# Patient Record
Sex: MALE | Race: ASIAN | NOT HISPANIC OR LATINO | URBAN - METROPOLITAN AREA
[De-identification: names, ages, dates, MRNs, and addresses within clinical notes are randomized per-mention and may not be internally consistent; named-entity substitution may affect disease eponyms.]

---

## 2023-05-10 ENCOUNTER — EMERGENCY (EMERGENCY)
Facility: HOSPITAL | Age: 83
LOS: 0 days | Discharge: HOME | End: 2023-05-10
Attending: EMERGENCY MEDICINE
Payer: MEDICARE

## 2023-05-10 VITALS
DIASTOLIC BLOOD PRESSURE: 65 MMHG | HEART RATE: 94 BPM | TEMPERATURE: 96 F | SYSTOLIC BLOOD PRESSURE: 121 MMHG | OXYGEN SATURATION: 100 % | RESPIRATION RATE: 17 BRPM

## 2023-05-10 DIAGNOSIS — Z00.8 ENCOUNTER FOR OTHER GENERAL EXAMINATION: ICD-10-CM

## 2023-05-10 DIAGNOSIS — Z91.83 WANDERING IN DISEASES CLASSIFIED ELSEWHERE: ICD-10-CM

## 2023-05-10 DIAGNOSIS — F03.918 UNSPECIFIED DEMENTIA, UNSPECIFIED SEVERITY, WITH OTHER BEHAVIORAL DISTURBANCE: ICD-10-CM

## 2023-05-10 PROCEDURE — 99283 EMERGENCY DEPT VISIT LOW MDM: CPT

## 2023-05-10 PROCEDURE — 99285 EMERGENCY DEPT VISIT HI MDM: CPT

## 2023-05-10 NOTE — ED ADULT NURSE NOTE - OBJECTIVE STATEMENT
Pt found to be wandering by the [place] on the streets. Pt is Cantonese speaking of unknown dialect. Pt has a history of dementia and is accompanied by his aide. Pt placed on constant observation in triage.

## 2023-05-10 NOTE — ED ADULT NURSE NOTE - NSFALLHARMRISKINTERV_ED_ALL_ED
Assistance OOB with selected safe patient handling equipment if applicable/Assistance with ambulation/Communicate risk of Fall with Harm to all staff, patient, and family/Monitor gait and stability/Monitor for mental status changes and reorient to person, place, and time, as needed/Move patient closer to nursing station/within visual sight of ED staff/Provide visual cue: red socks, yellow wristband, yellow gown, etc/Reinforce activity limits and safety measures with patient and family/Toileting schedule using arm’s reach rule for commode and bathroom/Use of alarms - bed, stretcher, chair and/or video monitoring/Bed in lowest position, wheels locked, appropriate side rails in place/Call bell, personal items and telephone in reach/Instruct patient to call for assistance before getting out of bed/chair/stretcher/Non-slip footwear applied when patient is off stretcher/[place] to call system/Physically safe environment - no spills, clutter or unnecessary equipment/Purposeful Proactive Rounding/Room/bathroom lighting operational, light cord in reach

## 2023-05-10 NOTE — ED ADULT TRIAGE NOTE - CHIEF COMPLAINT QUOTE
pt found wandering on street by [employer] found to be a missing person with dementia. unknown language. pt family contacted and on way. pt in no signs of distress.

## 2023-05-10 NOTE — ED PROVIDER NOTE - PATIENT PORTAL LINK FT
You can access the FollowMyHealth Patient Portal offered by [facility] by registering at the following website: [email or web address]. By joining [employer]’s FollowMyHealth portal, you will also be able to view your health information using other applications (apps) compatible with our system.

## 2023-05-10 NOTE — ED PROVIDER NOTE - OBJECTIVE STATEMENT
82y M pmh dementia presents for eval. As per aid pt went for a walk today and was unable to find his way home 2/2 dementia. Pt was found by police and brought to ED. Pt baseline as per aid. Denies any complaints.

## 2023-05-10 NOTE — ED PROVIDER NOTE - ATTENDING APP SHARED VISIT CONTRIBUTION OF CARE
81 yo M with PMH of dementia presents to the ED after he was found wandering the streets. His aid states that the  found him and brought him to the hospital. The aid is in the ED now and she states pt is acting his normal self. His son is on the way to pick him up.     Const: NAD  Eyes: PERRL, no conjunctival injection  HENT:  Neck supple without meningismus   CV: RRR, Warm, well-perfused extremities  RESP: CTA B/L, no tachypnea   GI: soft, non-tender, non-distended  MSK: No gross deformities appreciated  Skin: Warm, dry. No rashes, no ecchymosis   Neuro: Alert and walking around with normal gait    will wait for son to pick him up and dc home

## 2023-05-10 NOTE — ED PROVIDER NOTE - PHYSICAL EXAMINATION
CONST: Well appearing in NAD  NECK: Non-tender, no meningeal signs. normal ROM. supple   CARD: S1 S2; No jvd  RESP: Equal BS B/L, No wheezes, rhonchi or rales. No distress  GI: Soft, non-tender, non-distended. no cva tenderness. normal BS  MS: Normal ROM in all extremities. pulses 2 +. no calf tenderness or swelling  SKIN: Warm, dry, no acute rashes. Good turgor  NEURO: No focal deficits. Strength 5/5 with no sensory deficits. Steady gait.

## 2023-06-18 ENCOUNTER — EMERGENCY (EMERGENCY)
Facility: HOSPITAL | Age: 83
LOS: 0 days | Discharge: HOME | End: 2023-06-18
Attending: STUDENT IN AN ORGANIZED HEALTH CARE EDUCATION/TRAINING PROGRAM
Payer: MEDICARE

## 2023-06-18 VITALS
HEART RATE: 100 BPM | RESPIRATION RATE: 20 BRPM | WEIGHT: 125 LBS | SYSTOLIC BLOOD PRESSURE: 132 MMHG | OXYGEN SATURATION: 99 % | DIASTOLIC BLOOD PRESSURE: 64 MMHG | TEMPERATURE: 98 F

## 2023-06-18 DIAGNOSIS — F03.90 UNSPECIFIED DEMENTIA WITHOUT BEHAVIORAL DISTURBANCE: ICD-10-CM

## 2023-06-18 DIAGNOSIS — I10 ESSENTIAL (PRIMARY) HYPERTENSION: ICD-10-CM

## 2023-06-18 PROCEDURE — 99285 EMERGENCY DEPT VISIT HI MDM: CPT

## 2023-06-18 PROCEDURE — 99283 EMERGENCY DEPT VISIT LOW MDM: CPT

## 2023-06-18 PROCEDURE — 82962 GLUCOSE BLOOD TEST: CPT

## 2023-06-18 NOTE — ED PROVIDER NOTE - CLINICAL SUMMARY MEDICAL DECISION MAKING FREE TEXT BOX
82-year-old male with history of severe dementia ANO x0 at baseline, lives with his wife, hypertension presenting today after patient left house and lost his way last night.  Patient is found today by police, patient's daughter-in-law at bedside, denies any change in mental status,.  At this time patient is at baseline.  No recent fevers vomiting or diarrhea.  Rectal temp 99.4.  Otherwise nonfocal neurological exam, disoriented at baseline.  no external signs of trauma. Lungs clear to auscultation bilaterally.  Regular rate rhythm.  Abdomen soft nontender nondistended.  Tolerating oral intake in the ED.  Family states they do not need support taking care of patient. Stable for discharge at this time.

## 2023-06-18 NOTE — ED PROVIDER NOTE - OBJECTIVE STATEMENT
82-year-old male with past medical history of HTN and dementia presents to the ED with daughter after he went missing last night.  [employer] found patient around his home.  Daughter states patient is acting at baseline, denies any concerns. No fever, chills, trauma, weakness.

## 2023-06-18 NOTE — ED PROVIDER NOTE - PHYSICAL EXAMINATION
VITAL SIGNS: I have reviewed nursing notes and confirm.  CONSTITUTIONAL: Elderly male sitting on chair; in no acute distress.  SKIN: Skin exam is warm and dry, no acute rash.  HEAD: Normocephalic; atraumatic.  EYES: PERRL, EOM intact; conjunctiva and sclera clear.  ENT: No nasal discharge; airway clear.   NECK: Supple; non tender.  CARD: S1, S2 normal; no murmurs, gallops, or rubs. Regular rate and rhythm.  RESP: No wheezes, rales or rhonchi. Speaking in full sentences.   ABD: Normal bowel sounds; soft; non-distended; non-tender; No rebound or guarding. No CVA tenderness.  EXT: Normal ROM.   NEURO: Alert, oriented x 0. No focal deficits.

## 2023-06-18 NOTE — ED ADULT TRIAGE NOTE - BP NONINVASIVE DIASTOLIC (MM HG)
64 Libtayo Pregnancy And Lactation Text: This medication is contraindicated in pregnancy and when breast feeding.

## 2023-06-18 NOTE — ED PROVIDER NOTE - PATIENT PORTAL LINK FT
You can access the FollowMyHealth Patient Portal offered by [facility] by registering at the following website: [email or web address]. By joining [employer]’s FollowMyHealth portal, you will also be able to view your health information using other applications (apps) compatible with our system.

## 2023-06-18 NOTE — ED ADULT NURSE NOTE - NSFALLHARMRISKINTERV_ED_ALL_ED

## 2023-06-18 NOTE — ED PROVIDER NOTE - CARE PROVIDER_API CALL
Elias Lucio  Internal Medicine  [phone] [place] [address]  Phone: [phone]  Fax: [phone]  Follow Up Time: 4-6 Days

## 2023-06-18 NOTE — ED ADULT TRIAGE NOTE - CHIEF COMPLAINT QUOTE
[place] and [employer] [place]. was missing over night hx of dementia/ daughter is with him need medical evaluation

## 2024-11-20 NOTE — ED ADULT NURSE NOTE - NS ED NURSE DISCH DISPOSITION
Received request via: Patient    Was the patient seen in the last year in this department? Yes    Does the patient have an active prescription (recently filled or refills available) for medication(s) requested? No    Pharmacy Name:   [facility] [id number] - CECIL GOLDBERG [address] AT [address] [address]  Phone: [phone] Fax: [phone]        Does the patient have [facility] and need 100-day supply? (This applies to ALL medications) Patient does not have SCP   Discharged

## 2024-12-20 ENCOUNTER — INPATIENT (INPATIENT)
Facility: HOSPITAL | Age: 84
LOS: 15 days | Discharge: NURSING, REHAB OR LONG-TERM CARE | DRG: 640 | End: 2025-01-05
Attending: HOSPITALIST | Admitting: STUDENT IN AN ORGANIZED HEALTH CARE EDUCATION/TRAINING PROGRAM
Payer: MEDICARE

## 2024-12-20 VITALS
HEART RATE: 67 BPM | SYSTOLIC BLOOD PRESSURE: 107 MMHG | DIASTOLIC BLOOD PRESSURE: 63 MMHG | WEIGHT: 139.99 LBS | OXYGEN SATURATION: 99 % | RESPIRATION RATE: 18 BRPM | TEMPERATURE: 100 F | HEIGHT: 66 IN

## 2024-12-20 DIAGNOSIS — E87.0 HYPEROSMOLALITY AND HYPERNATREMIA: ICD-10-CM

## 2024-12-20 LAB
ALBUMIN SERPL ELPH-MCNC: 2.7 G/DL — LOW (ref 3.5–5.2)
ALP SERPL-CCNC: 98 U/L — SIGNIFICANT CHANGE UP (ref 30–115)
ALT FLD-CCNC: 57 U/L — HIGH (ref 0–41)
ANION GAP SERPL CALC-SCNC: 13 MMOL/L — SIGNIFICANT CHANGE UP (ref 7–14)
APPEARANCE UR: ABNORMAL
AST SERPL-CCNC: 95 U/L — HIGH (ref 0–41)
BACTERIA # UR AUTO: NEGATIVE /HPF — SIGNIFICANT CHANGE UP
BASOPHILS # BLD AUTO: 0.02 K/UL — SIGNIFICANT CHANGE UP (ref 0–0.2)
BASOPHILS NFR BLD AUTO: 0.2 % — SIGNIFICANT CHANGE UP (ref 0–1)
BILIRUB SERPL-MCNC: 0.2 MG/DL — SIGNIFICANT CHANGE UP (ref 0.2–1.2)
BILIRUB UR-MCNC: NEGATIVE — SIGNIFICANT CHANGE UP
BUN SERPL-MCNC: 91 MG/DL — CRITICAL HIGH (ref 10–20)
CALCIUM SERPL-MCNC: 8.2 MG/DL — LOW (ref 8.4–10.4)
CAST: 21 /LPF — HIGH (ref 0–4)
CHLORIDE SERPL-SCNC: 142 MMOL/L — HIGH (ref 98–110)
CO2 SERPL-SCNC: 20 MMOL/L — SIGNIFICANT CHANGE UP (ref 17–32)
COLOR SPEC: YELLOW — SIGNIFICANT CHANGE UP
CREAT SERPL-MCNC: 2.4 MG/DL — HIGH (ref 0.7–1.5)
DIFF PNL FLD: ABNORMAL
EGFR: 26 ML/MIN/1.73M2 — LOW
EOSINOPHIL # BLD AUTO: 0.12 K/UL — SIGNIFICANT CHANGE UP (ref 0–0.7)
EOSINOPHIL NFR BLD AUTO: 1 % — SIGNIFICANT CHANGE UP (ref 0–8)
GLUCOSE SERPL-MCNC: 138 MG/DL — HIGH (ref 70–99)
GLUCOSE UR QL: NEGATIVE MG/DL — SIGNIFICANT CHANGE UP
HCT VFR BLD CALC: 44.4 % — SIGNIFICANT CHANGE UP (ref 42–52)
HGB BLD-MCNC: 13.1 G/DL — LOW (ref 14–18)
IMM GRANULOCYTES NFR BLD AUTO: 0.6 % — HIGH (ref 0.1–0.3)
KETONES UR-MCNC: NEGATIVE MG/DL — SIGNIFICANT CHANGE UP
LEUKOCYTE ESTERASE UR-ACNC: NEGATIVE — SIGNIFICANT CHANGE UP
LYMPHOCYTES # BLD AUTO: 1.35 K/UL — SIGNIFICANT CHANGE UP (ref 1.2–3.4)
LYMPHOCYTES # BLD AUTO: 10.8 % — LOW (ref 20.5–51.1)
MAGNESIUM SERPL-MCNC: 3.6 MG/DL — CRITICAL HIGH (ref 1.8–2.4)
MAGNESIUM SERPL-MCNC: 4 MG/DL — CRITICAL HIGH (ref 1.8–2.4)
MCHC RBC-ENTMCNC: 29.5 G/DL — LOW (ref 32–37)
MCHC RBC-ENTMCNC: 30.5 PG — SIGNIFICANT CHANGE UP (ref 27–31)
MCV RBC AUTO: 103.3 FL — HIGH (ref 80–94)
MONOCYTES # BLD AUTO: 0.45 K/UL — SIGNIFICANT CHANGE UP (ref 0.1–0.6)
MONOCYTES NFR BLD AUTO: 3.6 % — SIGNIFICANT CHANGE UP (ref 1.7–9.3)
NEUTROPHILS # BLD AUTO: 10.5 K/UL — HIGH (ref 1.4–6.5)
NEUTROPHILS NFR BLD AUTO: 83.8 % — HIGH (ref 42.2–75.2)
NITRITE UR-MCNC: NEGATIVE — SIGNIFICANT CHANGE UP
NRBC # BLD: 0 /100 WBCS — SIGNIFICANT CHANGE UP (ref 0–0)
OSMOLALITY SERPL: 396 MOS/KG — HIGH (ref 280–301)
OSMOLALITY UR: 812 MOS/KG — SIGNIFICANT CHANGE UP (ref 50–1200)
PH UR: 5.5 — SIGNIFICANT CHANGE UP (ref 5–8)
PHOSPHATE SERPL-MCNC: 3.2 MG/DL — SIGNIFICANT CHANGE UP (ref 2.1–4.9)
PLATELET # BLD AUTO: 223 K/UL — SIGNIFICANT CHANGE UP (ref 130–400)
PMV BLD: 11.5 FL — HIGH (ref 7.4–10.4)
POTASSIUM SERPL-MCNC: 4.2 MMOL/L — SIGNIFICANT CHANGE UP (ref 3.5–5)
POTASSIUM SERPL-SCNC: 4.2 MMOL/L — SIGNIFICANT CHANGE UP (ref 3.5–5)
PROT SERPL-MCNC: 6.8 G/DL — SIGNIFICANT CHANGE UP (ref 6–8)
PROT UR-MCNC: 30 MG/DL
RBC # BLD: 4.3 M/UL — LOW (ref 4.7–6.1)
RBC # FLD: 14.4 % — SIGNIFICANT CHANGE UP (ref 11.5–14.5)
RBC CASTS # UR COMP ASSIST: 26 /HPF — HIGH (ref 0–4)
SODIUM SERPL-SCNC: 175 MMOL/L — CRITICAL HIGH (ref 135–146)
SODIUM UR-SCNC: 50 MMOL/L — SIGNIFICANT CHANGE UP
SP GR SPEC: 1.03 — SIGNIFICANT CHANGE UP (ref 1–1.03)
SQUAMOUS # UR AUTO: 4 /HPF — SIGNIFICANT CHANGE UP (ref 0–5)
UROBILINOGEN FLD QL: 1 MG/DL — SIGNIFICANT CHANGE UP (ref 0.2–1)
WBC # BLD: 12.52 K/UL — HIGH (ref 4.8–10.8)
WBC # FLD AUTO: 12.52 K/UL — HIGH (ref 4.8–10.8)
WBC UR QL: 8 /HPF — HIGH (ref 0–5)

## 2024-12-20 PROCEDURE — 71250 CT THORAX DX C-: CPT | Mod: 26,MC

## 2024-12-20 PROCEDURE — 84443 ASSAY THYROID STIM HORMONE: CPT

## 2024-12-20 PROCEDURE — 92526 ORAL FUNCTION THERAPY: CPT | Mod: GN

## 2024-12-20 PROCEDURE — 99285 EMERGENCY DEPT VISIT HI MDM: CPT

## 2024-12-20 PROCEDURE — 86850 RBC ANTIBODY SCREEN: CPT

## 2024-12-20 PROCEDURE — 36415 COLL VENOUS BLD VENIPUNCTURE: CPT

## 2024-12-20 PROCEDURE — 76770 US EXAM ABDO BACK WALL COMP: CPT

## 2024-12-20 PROCEDURE — 99223 1ST HOSP IP/OBS HIGH 75: CPT

## 2024-12-20 PROCEDURE — 71045 X-RAY EXAM CHEST 1 VIEW: CPT | Mod: 26

## 2024-12-20 PROCEDURE — 82607 VITAMIN B-12: CPT

## 2024-12-20 PROCEDURE — 92610 EVALUATE SWALLOWING FUNCTION: CPT | Mod: GN

## 2024-12-20 PROCEDURE — 80053 COMPREHEN METABOLIC PANEL: CPT

## 2024-12-20 PROCEDURE — 84100 ASSAY OF PHOSPHORUS: CPT

## 2024-12-20 PROCEDURE — 85027 COMPLETE CBC AUTOMATED: CPT

## 2024-12-20 PROCEDURE — 87040 BLOOD CULTURE FOR BACTERIA: CPT

## 2024-12-20 PROCEDURE — 76870 US EXAM SCROTUM: CPT

## 2024-12-20 PROCEDURE — 86901 BLOOD TYPING SEROLOGIC RH(D): CPT

## 2024-12-20 PROCEDURE — 93010 ELECTROCARDIOGRAM REPORT: CPT

## 2024-12-20 PROCEDURE — 83735 ASSAY OF MAGNESIUM: CPT

## 2024-12-20 PROCEDURE — 84439 ASSAY OF FREE THYROXINE: CPT

## 2024-12-20 PROCEDURE — 93005 ELECTROCARDIOGRAM TRACING: CPT

## 2024-12-20 PROCEDURE — 82746 ASSAY OF FOLIC ACID SERUM: CPT

## 2024-12-20 PROCEDURE — 86900 BLOOD TYPING SEROLOGIC ABO: CPT

## 2024-12-20 PROCEDURE — 80048 BASIC METABOLIC PNL TOTAL CA: CPT

## 2024-12-20 PROCEDURE — 0241U: CPT

## 2024-12-20 PROCEDURE — 82962 GLUCOSE BLOOD TEST: CPT

## 2024-12-20 PROCEDURE — 85025 COMPLETE CBC W/AUTO DIFF WBC: CPT

## 2024-12-20 RX ORDER — METRONIDAZOLE 250 MG/1
500 TABLET ORAL EVERY 8 HOURS
Refills: 0 | Status: DISCONTINUED | OUTPATIENT
Start: 2024-12-20 | End: 2024-12-21

## 2024-12-20 RX ORDER — HEPARIN SODIUM 1000 [USP'U]/ML
5000 INJECTION, SOLUTION INTRAVENOUS; SUBCUTANEOUS EVERY 12 HOURS
Refills: 0 | Status: DISCONTINUED | OUTPATIENT
Start: 2024-12-20 | End: 2024-12-23

## 2024-12-20 RX ORDER — DONEPEZIL HYDROCHLORIDE 5 MG/1
1 TABLET, FILM COATED ORAL
Refills: 0 | DISCHARGE

## 2024-12-20 RX ORDER — VANCOMYCIN HYDROCHLORIDE 5 G/100ML
750 INJECTION, POWDER, LYOPHILIZED, FOR SOLUTION INTRAVENOUS EVERY 24 HOURS
Refills: 0 | Status: DISCONTINUED | OUTPATIENT
Start: 2024-12-20 | End: 2024-12-21

## 2024-12-20 RX ORDER — ASPIRIN 81 MG
1 TABLET, DELAYED RELEASE (ENTERIC COATED) ORAL
Refills: 0 | DISCHARGE

## 2024-12-20 RX ORDER — CHLORHEXIDINE GLUCONATE 1.2 MG/ML
1 RINSE ORAL DAILY
Refills: 0 | Status: DISCONTINUED | OUTPATIENT
Start: 2024-12-20 | End: 2025-01-05

## 2024-12-20 RX ORDER — QUETIAPINE FUMARATE 100 MG/1
0.5 TABLET, FILM COATED ORAL
Refills: 0 | DISCHARGE

## 2024-12-20 RX ORDER — SODIUM CHLORIDE 9 MG/ML
1000 INJECTION, SOLUTION INTRAVENOUS
Refills: 0 | Status: DISCONTINUED | OUTPATIENT
Start: 2024-12-20 | End: 2024-12-21

## 2024-12-20 RX ORDER — FINASTERIDE 5 MG
1 TABLET ORAL
Refills: 0 | DISCHARGE

## 2024-12-20 RX ORDER — CHOLECALCIFEROL (VITAMIN D3) 10 MCG
1 TABLET ORAL
Refills: 0 | DISCHARGE

## 2024-12-20 RX ORDER — SODIUM CHLORIDE 9 MG/ML
1000 INJECTION, SOLUTION INTRAVENOUS ONCE
Refills: 0 | Status: COMPLETED | OUTPATIENT
Start: 2024-12-20 | End: 2024-12-20

## 2024-12-20 RX ORDER — B COMPLEX, C NO.20/FOLIC ACID 1 MG
1 CAPSULE ORAL
Refills: 0 | DISCHARGE

## 2024-12-20 RX ORDER — AZITHROMYCIN MONOHYDRATE 200 MG/5ML
500 POWDER, FOR SUSPENSION ORAL ONCE
Refills: 0 | Status: COMPLETED | OUTPATIENT
Start: 2024-12-20 | End: 2024-12-20

## 2024-12-20 RX ORDER — CEFTRIAXONE SODIUM 1 G/1
1000 INJECTION, POWDER, FOR SOLUTION INTRAMUSCULAR; INTRAVENOUS ONCE
Refills: 0 | Status: COMPLETED | OUTPATIENT
Start: 2024-12-20 | End: 2024-12-20

## 2024-12-20 RX ADMIN — SODIUM CHLORIDE 1000 MILLILITER(S): 9 INJECTION, SOLUTION INTRAVENOUS at 19:16

## 2024-12-20 RX ADMIN — CEFTRIAXONE SODIUM 100 MILLIGRAM(S): 1 INJECTION, POWDER, FOR SOLUTION INTRAMUSCULAR; INTRAVENOUS at 21:09

## 2024-12-20 RX ADMIN — SODIUM CHLORIDE 75 MILLILITER(S): 9 INJECTION, SOLUTION INTRAVENOUS at 22:59

## 2024-12-20 RX ADMIN — AZITHROMYCIN MONOHYDRATE 255 MILLIGRAM(S): 200 POWDER, FOR SUSPENSION ORAL at 21:28

## 2024-12-20 NOTE — H&P ADULT - HISTORY OF PRESENT ILLNESS
82-year-old male with past medical history of HTN and dementia presents to the ED from SNF for abnormal lab result.    Triage VS: /63, HR 67, T 99.9F, SpO2 99% on RA.  Labs: WBC 12.52k (83.8% neutrophil), CMP with Na 175, Cl 142, BUN/Cr 91/2.4, AST/ALT 95/57, Mag 3.6, serum osm 396.  UA with mod blood (26 RBC), neg LE/nitrite (8 WBC), 21 casts, 4 epithelial cells.  Shant 50, UOsm 812.    CXR (pending official read) appears to have right sided basilar opacity.    ED interventions: 1L LR bolus, 1g ceftriaxone, 500mg azithromycin,  84-year-old male with past medical history of HTN, BPH, CAD, vitamin D def, and Alzheimer's dementia presents to the ED from SNF for abnormal lab result of Na 177. The patient is awake and active but non-verbal.  Called son on both the numbers provided on NH paperwork, he answered but said no English --> called on both numbers again with Cantonese  but he did not answer.  Also unsure if language is Cantonese or Mandarin but the patient did not respond to questions using either .  Cantonese: [id number], [id number].  Mandarin: [id number].    NKA per NH paperwork.    Triage VS: /63, HR 67, T 99.9F, SpO2 99% on RA.  Labs: WBC 12.52k (83.8% neutrophil), CMP with Na 175, Cl 142, BUN/Cr 91/2.4, AST/ALT 95/57, Mag 3.6, serum osm 396.  UA with mod blood (26 RBC), neg LE/nitrite (8 WBC), 21 casts, 4 epithelial cells.  Shant 50, UOsm 812.    CXR (pending official read) appears to have right sided basilar opacity.    ED interventions: 1L LR bolus, 1g ceftriaxone, 500mg azithromycin,

## 2024-12-20 NOTE — H&P ADULT - NSICDXPASTMEDICALHX_GEN_ALL_CORE_FT
PAST MEDICAL HISTORY:  Alzheimer disease     Benign essential HTN     BPH (benign prostatic hyperplasia)     CAD (coronary artery disease)     Central pain syndrome     Vitamin D deficiency

## 2024-12-20 NOTE — ED PROVIDER NOTE - CLINICAL SUMMARY MEDICAL DECISION MAKING FREE TEXT BOX
Patient ANO x 0.  Presented nursing home with no proper history provided.  Appears very dehydrated cachectic and chronically ill.  Chest x-ray interpreted by me noted an opacity of the left lung possibly malignancy versus pneumonia.  Patient severely hypernatremic given small amount of fluids.  Admitted to the stepdown unit for further electrolyte monitoring and treament

## 2024-12-20 NOTE — ED PROVIDER NOTE - ATTENDING APP SHARED VISIT CONTRIBUTION OF CARE
I saw and evaluated the patient on my own.  Briefly, I have the following impression and plan...  hypernatremiaSent in from nursing home.  Severely cachectic ill-appearing.Infectious workup and malignancy workup required  Please see MDM for further details.

## 2024-12-20 NOTE — H&P ADULT - ATTENDING COMMENTS
Patient seen and examined at bedside independently of the residents. I read the resident's note and agree with the plan with the additions and corrections as noted below. My note supersedes the resident's note.     REVIEW OF SYSTEMS:  Negative except in HPI.     PMH:  HTN, CAD, BPH and Alzheimer dementia    FHx: Reviewed. No fhx of asthma/copd, No fhx of liver and pulmonary disease. No fhx of hematological disorder.     Physical Exam:  GEN: No acute distress. Awake, Alert. Non verbal. Not following commands.   Head: Atraumatic, Normocephalic.   Eye: PEERLA. No sclera icterus.    ENT: Normal oropharynx, no thyromegaly, no mass, no lymphadenopathy.   LUNGS: Clear to auscultation bilaterally. No wheeze/rales/crackles.   HEART: Normal. S1/S2 present. RRR. No murmur/gallops.   ABD: Soft, non-tender, non-distended. Bowel sounds present.   EXT: No pitting edema. No erythema. No tenderness.  Integumentary: No rash, No sore, No petechia.   NEURO: Moving all extremities.     Vital Signs Last 24 Hrs  T(C): 37.2 (20 Dec 2024 23:25), Max: 37.7 (20 Dec 2024 17:16)  T(F): 98.9 (20 Dec 2024 23:25), Max: 99.9 (20 Dec 2024 17:16)  HR: 66 (21 Dec 2024 00:05) (66 - 77)  BP: 90/51 (21 Dec 2024 00:05) (90/51 - 108/50)  BP(mean): --  RR: 18 (21 Dec 2024 00:05) (18 - 18)  SpO2: 100% (21 Dec 2024 00:05) (99% - 100%)    Parameters below as of 21 Dec 2024 00:05  Patient On (Oxygen Delivery Method): room air      Please see the above notes for Labs and radiology.     Assessment and Plan:     85 yo M with hx of HTN, CAD, BPH and Alzheimer dementia  presents from NH for hypernatremia.     Hypernatremia 2/2 dehydration  - s/p 1L LR bolus given in ED.   - c/w LR @ 75cc/hr x 12 hrs  -  monitor BMP closely.   - Do not correct serum Na > 8mEq per 24hr.     RLL PNA 2/2 aspiration?  - CT shows Right lower lobe multifocal consolidative and nodular opacities. Additional smaller nodular opacities in all 5 lobes. Findings most likely infectious in nature. Correlate with symptoms. Given the indicated concern for malignancy, follow up in one month is recommended for resolution and re-evaluation.  - Vanc, Cefepime and flagyl for now  - check BCx, MRSA nares  - aspiration precaution.   - NPO with speech and swallow eval.     HTN - hold anti-HTN medication for now. BP borderline.   Alzheimer dementia - c/w home med.   BPH - c/w home med.    DVT ppx: Lovenox SC  GI ppx: not indicated.   Diet: NPO with speech and swallow eval  Activity: as tolerated.     Date seen by the attendin2024  I have spent 75 minutes of time on the encounter which excludes teaching and/or separately reported services.

## 2024-12-20 NOTE — H&P ADULT - ASSESSMENT
84-year-old male with past medical history of HTN, BPH, CAD, vitamin D def, and Alzheimer's dementia presents to the ED from SNF for abnormal lab result of Na 177. The patient is awake and active but non-verbal.    #Severe hypernatremia likely 2/2 dehydration/poor PO intake  - CMP with Na 175, Cl 142, BUN/Cr 91/2.4, serum osm 396.  - UA with mod blood (26 RBC), neg LE/nitrite (8 WBC), 21 casts, 4 epithelial cells.  - Shant 50, UOsm 812.  - Appears dry on exam.  Plan:  - Already received 1L LR bolus from ED.  - Will start LR at 75cc/hr and repeat profile at 11:30 pm to monitor for correction rate.  - F/u nephro consult (Dr. Mondragon).    #Possible aspiration pneumonia  - Triage VS: /63, HR 67, T 99.9F, SpO2 99% on RA.  - Labs: WBC 12.52k (83.8% neutrophil)  - ED interventions: 1L LR bolus, 1g ceftriaxone, 500mg azithromycin.  - CT chest non con: Right lower lobe multifocal consolidative and nodular opacities. Additional smaller nodular opacities in all 5 lobes. Findings most likely infectious in nature. Correlate with symptoms. Given the indicated concern for malignancy, follow up in one month is recommended for resolution and re-evaluation.  Plan:  - Renally dosed antibiotics as follows: cefepime 2g Q24h, vancomycin 750mg Q24h, metronidazole 500 Q8H.  - Strict NPO until s/s evaluation.  - Aspiration precautions.  - F/u flu rsv covid swab (sent).  - F/u BCx, urine strep and legionella. Doubt sending sputum culture will be possible given that the patient does not follow commands.  - Aspiration precautions.    #Dementia  - Hold PO meds, reintroduce based on s/s eval.    #HTN  - BP soft, hold home antihypertensive.    #CAD  - Hold ASA (given NPO).    #BPH  - Hold home meds (given NPO).    #DIET: NPO until s/s eval  #DVTppx: SQH Q12h  #GIppx: NI  #Activity: Increase as tolerated  #CODE: FULL  #Disposition: from [facility], will likely return when stable.

## 2024-12-20 NOTE — H&P ADULT - NSHPPHYSICALEXAM_GEN_ALL_CORE
GENERAL: NAD, lying in bed comfortably  HEAD:  Atraumatic, normocephalic  EYES: EOMI, PERRL  HEART: Regular rate and rhythm  LUNGS: Unlabored respirations. Clear to auscultation bilaterally, no wheezing  ABDOMEN: Soft, nontender, nondistended  EXTREMITIES: No edema, skin appears dry  NERVOUS SYSTEM: Awake and alert, fixing blanket each time examination is attempted, but non-verbal, not following commands using English, Cantonese, or Mandarin translators. Moving every limb on his own accord. Not agitated.

## 2024-12-20 NOTE — ED PROVIDER NOTE - PHYSICAL EXAMINATION
VITAL SIGNS: I have reviewed nursing notes and confirm.  CONSTITUTIONAL: +Cachetic elderly male in no acute distress.  SKIN: Skin exam is warm and dry, no acute rash.  HEAD: Normocephalic; atraumatic.  EYES: PERRL, EOM intact; conjunctiva and sclera clear.  ENT: No nasal discharge; airway clear.   NECK: Supple; non tender.  CARD: S1, S2 normal; no murmurs, gallops, or rubs. Regular rate and rhythm.  RESP: Clear to auscultation bilaterally. No wheezes, rales or rhonchi.  ABD: Normal bowel sounds; soft; non-distended; non-tender.   EXT: Normal ROM. No edema.  LYMPH: No acute cervical adenopathy.  NEURO: AAOx0.   PSYCH: Cooperative, appropriate.

## 2024-12-21 LAB
ALBUMIN SERPL ELPH-MCNC: 2.5 G/DL — LOW (ref 3.5–5.2)
ALP SERPL-CCNC: 100 U/L — SIGNIFICANT CHANGE UP (ref 30–115)
ALT FLD-CCNC: 46 U/L — HIGH (ref 0–41)
ANION GAP SERPL CALC-SCNC: 10 MMOL/L — SIGNIFICANT CHANGE UP (ref 7–14)
ANION GAP SERPL CALC-SCNC: 9 MMOL/L — SIGNIFICANT CHANGE UP (ref 7–14)
AST SERPL-CCNC: 60 U/L — HIGH (ref 0–41)
BASOPHILS # BLD AUTO: 0.03 K/UL — SIGNIFICANT CHANGE UP (ref 0–0.2)
BASOPHILS NFR BLD AUTO: 0.3 % — SIGNIFICANT CHANGE UP (ref 0–1)
BILIRUB SERPL-MCNC: 0.3 MG/DL — SIGNIFICANT CHANGE UP (ref 0.2–1.2)
BUN SERPL-MCNC: 76 MG/DL — CRITICAL HIGH (ref 10–20)
BUN SERPL-MCNC: 82 MG/DL — CRITICAL HIGH (ref 10–20)
CALCIUM SERPL-MCNC: 7.9 MG/DL — LOW (ref 8.4–10.5)
CALCIUM SERPL-MCNC: 8.4 MG/DL — SIGNIFICANT CHANGE UP (ref 8.4–10.5)
CHLORIDE SERPL-SCNC: 141 MMOL/L — HIGH (ref 98–110)
CHLORIDE SERPL-SCNC: 143 MMOL/L — HIGH (ref 98–110)
CO2 SERPL-SCNC: 23 MMOL/L — SIGNIFICANT CHANGE UP (ref 17–32)
CO2 SERPL-SCNC: 23 MMOL/L — SIGNIFICANT CHANGE UP (ref 17–32)
CREAT SERPL-MCNC: 2.1 MG/DL — HIGH (ref 0.7–1.5)
CREAT SERPL-MCNC: 2.2 MG/DL — HIGH (ref 0.7–1.5)
EGFR: 29 ML/MIN/1.73M2 — LOW
EGFR: 30 ML/MIN/1.73M2 — LOW
EOSINOPHIL # BLD AUTO: 0.23 K/UL — SIGNIFICANT CHANGE UP (ref 0–0.7)
EOSINOPHIL NFR BLD AUTO: 1.9 % — SIGNIFICANT CHANGE UP (ref 0–8)
FLUAV AG NPH QL: SIGNIFICANT CHANGE UP
FLUBV AG NPH QL: SIGNIFICANT CHANGE UP
GLUCOSE SERPL-MCNC: 123 MG/DL — HIGH (ref 70–99)
GLUCOSE SERPL-MCNC: 81 MG/DL — SIGNIFICANT CHANGE UP (ref 70–99)
HCT VFR BLD CALC: 43.9 % — SIGNIFICANT CHANGE UP (ref 42–52)
HGB BLD-MCNC: 12.7 G/DL — LOW (ref 14–18)
IMM GRANULOCYTES NFR BLD AUTO: 0.7 % — HIGH (ref 0.1–0.3)
LYMPHOCYTES # BLD AUTO: 1.55 K/UL — SIGNIFICANT CHANGE UP (ref 1.2–3.4)
LYMPHOCYTES # BLD AUTO: 13.1 % — LOW (ref 20.5–51.1)
MAGNESIUM SERPL-MCNC: 3.5 MG/DL — CRITICAL HIGH (ref 1.8–2.4)
MCHC RBC-ENTMCNC: 28.9 G/DL — LOW (ref 32–37)
MCHC RBC-ENTMCNC: 30.4 PG — SIGNIFICANT CHANGE UP (ref 27–31)
MCV RBC AUTO: 105 FL — HIGH (ref 80–94)
MONOCYTES # BLD AUTO: 0.49 K/UL — SIGNIFICANT CHANGE UP (ref 0.1–0.6)
MONOCYTES NFR BLD AUTO: 4.1 % — SIGNIFICANT CHANGE UP (ref 1.7–9.3)
NEUTROPHILS # BLD AUTO: 9.44 K/UL — HIGH (ref 1.4–6.5)
NEUTROPHILS NFR BLD AUTO: 79.9 % — HIGH (ref 42.2–75.2)
NRBC # BLD: 0 /100 WBCS — SIGNIFICANT CHANGE UP (ref 0–0)
PHOSPHATE SERPL-MCNC: 2.7 MG/DL — SIGNIFICANT CHANGE UP (ref 2.1–4.9)
PLATELET # BLD AUTO: 203 K/UL — SIGNIFICANT CHANGE UP (ref 130–400)
PMV BLD: 11.8 FL — HIGH (ref 7.4–10.4)
POTASSIUM SERPL-MCNC: 4 MMOL/L — SIGNIFICANT CHANGE UP (ref 3.5–5)
POTASSIUM SERPL-MCNC: 4.3 MMOL/L — SIGNIFICANT CHANGE UP (ref 3.5–5)
POTASSIUM SERPL-SCNC: 4 MMOL/L — SIGNIFICANT CHANGE UP (ref 3.5–5)
POTASSIUM SERPL-SCNC: 4.3 MMOL/L — SIGNIFICANT CHANGE UP (ref 3.5–5)
PROT SERPL-MCNC: 6.5 G/DL — SIGNIFICANT CHANGE UP (ref 6–8)
RBC # BLD: 4.18 M/UL — LOW (ref 4.7–6.1)
RBC # FLD: 14.3 % — SIGNIFICANT CHANGE UP (ref 11.5–14.5)
RSV RNA NPH QL NAA+NON-PROBE: SIGNIFICANT CHANGE UP
SARS-COV-2 RNA SPEC QL NAA+PROBE: SIGNIFICANT CHANGE UP
SODIUM SERPL-SCNC: 173 MMOL/L — CRITICAL HIGH (ref 135–146)
SODIUM SERPL-SCNC: 176 MMOL/L — CRITICAL HIGH (ref 135–146)
WBC # BLD: 11.82 K/UL — HIGH (ref 4.8–10.8)
WBC # FLD AUTO: 11.82 K/UL — HIGH (ref 4.8–10.8)

## 2024-12-21 PROCEDURE — 99291 CRITICAL CARE FIRST HOUR: CPT

## 2024-12-21 PROCEDURE — 99233 SBSQ HOSP IP/OBS HIGH 50: CPT

## 2024-12-21 PROCEDURE — 93010 ELECTROCARDIOGRAM REPORT: CPT

## 2024-12-21 PROCEDURE — 76770 US EXAM ABDO BACK WALL COMP: CPT | Mod: 26

## 2024-12-21 RX ORDER — SODIUM CHLORIDE 9 MG/ML
1000 INJECTION, SOLUTION INTRAVENOUS
Refills: 0 | Status: DISCONTINUED | OUTPATIENT
Start: 2024-12-21 | End: 2024-12-22

## 2024-12-21 RX ORDER — PIPERACILLIN AND TAZOBACTAM 3; .375 G/15ML; G/15ML
2.25 INJECTION, POWDER, LYOPHILIZED, FOR SOLUTION INTRAVENOUS ONCE
Refills: 0 | Status: COMPLETED | OUTPATIENT
Start: 2024-12-21 | End: 2024-12-21

## 2024-12-21 RX ORDER — PIPERACILLIN AND TAZOBACTAM 3; .375 G/15ML; G/15ML
2.25 INJECTION, POWDER, LYOPHILIZED, FOR SOLUTION INTRAVENOUS EVERY 8 HOURS
Refills: 0 | Status: DISCONTINUED | OUTPATIENT
Start: 2024-12-21 | End: 2024-12-24

## 2024-12-21 RX ADMIN — PIPERACILLIN AND TAZOBACTAM 200 GRAM(S): 3; .375 INJECTION, POWDER, LYOPHILIZED, FOR SOLUTION INTRAVENOUS at 09:33

## 2024-12-21 RX ADMIN — HEPARIN SODIUM 5000 UNIT(S): 1000 INJECTION, SOLUTION INTRAVENOUS; SUBCUTANEOUS at 18:16

## 2024-12-21 RX ADMIN — SODIUM CHLORIDE 80 MILLILITER(S): 9 INJECTION, SOLUTION INTRAVENOUS at 09:34

## 2024-12-21 RX ADMIN — HEPARIN SODIUM 5000 UNIT(S): 1000 INJECTION, SOLUTION INTRAVENOUS; SUBCUTANEOUS at 05:39

## 2024-12-21 RX ADMIN — PIPERACILLIN AND TAZOBACTAM 2.25 GRAM(S): 3; .375 INJECTION, POWDER, LYOPHILIZED, FOR SOLUTION INTRAVENOUS at 13:25

## 2024-12-21 RX ADMIN — PIPERACILLIN AND TAZOBACTAM 200 GRAM(S): 3; .375 INJECTION, POWDER, LYOPHILIZED, FOR SOLUTION INTRAVENOUS at 13:03

## 2024-12-21 RX ADMIN — CHLORHEXIDINE GLUCONATE 1 APPLICATION(S): 1.2 RINSE ORAL at 14:58

## 2024-12-21 RX ADMIN — PIPERACILLIN AND TAZOBACTAM 200 GRAM(S): 3; .375 INJECTION, POWDER, LYOPHILIZED, FOR SOLUTION INTRAVENOUS at 21:32

## 2024-12-21 RX ADMIN — METRONIDAZOLE 100 MILLIGRAM(S): 250 TABLET ORAL at 05:39

## 2024-12-21 NOTE — CONSULT NOTE ADULT - SUBJECTIVE AND OBJECTIVE BOX
Patient is a 84y old  Male who presents with a chief complaint of abnormal labs    84-year-old male with past medical history of HTN, BPH, CAD, vitamin D def, and Alzheimer's dementia presents to the ED from SNF for abnormal lab result of Na 177. The patient is awake and active but non-verbal.  Called son on both the numbers provided on NH paperwork, he answered but said no English --> called on both numbers again with Cantonese  but he did not answer.  Also unsure if language is Cantonese or Mandarin but the patient did not respond to questions using either .  Cantonese: [id number], [id number].  Mandarin: [id number].    NKA per NH paperwork.    Triage VS: /63, HR 67, T 99.9F, SpO2 99% on RA.  Labs: WBC 12.52k (83.8% neutrophil), CMP with Na 175, Cl 142, BUN/Cr 91/2.4, AST/ALT 95/57, Mag 3.6, serum osm 396.  UA with mod blood (26 RBC), neg LE/nitrite (8 WBC), 21 casts, 4 epithelial cells.  Shant 50, UOsm 812.    ED interventions: 1L LR bolus, 1g ceftriaxone, 500mg azithromycin,  (20 Dec 2024 21:20)  admitted to SDU, called to evaluate    PAST MEDICAL & SURGICAL HISTORY:  CAD (coronary artery disease)      Central pain syndrome      BPH (benign prostatic hyperplasia)      Benign essential HTN      Vitamin D deficiency      Alzheimer disease          SOCIAL HX:   Smoking  uto        REVIEW OF SYSTEMS SEE HPI    Allergies    No Known Allergies    Intolerances        cefepime   IVPB 2000 milliGRAM(s) IV Intermittent every 24 hours  chlorhexidine 2% Cloths 1 Application(s) Topical daily  heparin   Injectable 5000 Unit(s) SubCutaneous every 12 hours  lactated ringers. 1000 milliLiter(s) IV Continuous <Continuous>  metroNIDAZOLE  IVPB 500 milliGRAM(s) IV Intermittent every 8 hours  vancomycin  IVPB 750 milliGRAM(s) IV Intermittent every 24 hours  : Home Meds:      PHYSICAL EXAM    ICU Vital Signs Last 24 Hrs  T(C): 37.2 (20 Dec 2024 23:25), Max: 37.7 (20 Dec 2024 17:16)  T(F): 98.9 (20 Dec 2024 23:25), Max: 99.9 (20 Dec 2024 17:16)  HR: 68 (21 Dec 2024 05:31) (66 - 77)  BP: 126/66 (21 Dec 2024 05:31) (90/51 - 126/66)  RR: 18 (21 Dec 2024 05:31) (18 - 18)  SpO2: 100% (21 Dec 2024 05:31) (99% - 100%)    O2 Parameters below as of 21 Dec 2024 05:31  Patient On (Oxygen Delivery Method): room air            General: Ill looking  Lungs: Bilateral rhonchi  Cardiovascular: DANE 2.6  Abdomen: Soft, Positive BS  Extremities: No clubbing  Neurological: Non focal         LABS:                          13.1   12.52 )-----------( 223      ( 20 Dec 2024 18:45 )             44.4                                               12-21    173[HH]  |  141[H]  |  82[HH]  ----------------------------<  123[H]  4.0   |  23  |  2.2[H]    Ca    7.9[L]      21 Dec 2024 00:59  Phos  3.2     12-20  Mg     4.0     12-20    TPro  6.8  /  Alb  2.7[L]  /  TBili  0.2  /  DBili  x   /  AST  95[H]  /  ALT  57[H]  /  AlkPhos  98  12-20                                             Urinalysis Basic - ( 21 Dec 2024 00:59 )    Color: x / Appearance: x / SG: x / pH: x  Gluc: 123 mg/dL / Ketone: x  / Bili: x / Urobili: x   Blood: x / Protein: x / Nitrite: x   Leuk Esterase: x / RBC: x / WBC x   Sq Epi: x / Non Sq Epi: x / Bacteria: x                                                  LIVER FUNCTIONS - ( 20 Dec 2024 18:45 )  Alb: 2.7 g/dL / Pro: 6.8 g/dL / ALK PHOS: 98 U/L / ALT: 57 U/L / AST: 95 U/L / GGT: x                                                  Urinalysis with Rflx Culture (collected 20 Dec 2024 19:08)                                                                                               MEDICATIONS  (STANDING):  cefepime   IVPB 2000 milliGRAM(s) IV Intermittent every 24 hours  chlorhexidine 2% Cloths 1 Application(s) Topical daily  heparin   Injectable 5000 Unit(s) SubCutaneous every 12 hours  lactated ringers. 1000 milliLiter(s) (75 mL/Hr) IV Continuous <Continuous>  metroNIDAZOLE  IVPB 500 milliGRAM(s) IV Intermittent every 8 hours  vancomycin  IVPB 750 milliGRAM(s) IV Intermittent every 24 hours    CT/ CXR reviewed

## 2024-12-21 NOTE — PROGRESS NOTE ADULT - ASSESSMENT
84-year-old male with past medical history of HTN, BPH, CAD, vitamin D def, and Alzheimer's dementia presents to the ED from SNF for abnormal lab result of Na 177. The patient is awake and active but non-verbal.    #Severe hypernatremia likely 2/2 dehydration/poor PO intake  - CMP with Na 175, Cl 142, BUN/Cr 91/2.4, serum osm 396.  - UA with mod blood (26 RBC), neg LE/nitrite (8 WBC), 21 casts, 4 epithelial cells.  - Shant 50, UOsm 812.  - Appears dry on exam.  - F/u nephro consult (Dr. Mondragon).  - changed fluid to D5 1/2 NS  - f/u BMP at 4PM  - correct Mag    #Possible aspiration pneumonia  - Triage VS: /63, HR 67, T 99.9F, SpO2 99% on RA.  - Labs: WBC 12.52k (83.8% neutrophil)  - ED interventions: 1L LR bolus, 1g ceftriaxone, 500mg azithromycin.  - CT chest non con: Right lower lobe multifocal consolidative and nodular opacities. Additional smaller nodular opacities in all 5 lobes. Findings most likely infectious in nature. Correlate with symptoms. Given the indicated concern for malignancy, follow up in one month is recommended for resolution and re-evaluation.  - Strict NPO until s/s evaluation.  - Aspiration precautions.  - F/u flu rsv covid swab (sent).  - F/u BCx, urine strep and legionella. Doubt sending sputum culture will be possible given that the patient does not follow commands.  - Aspiration precautions.  - switch abx to zosyn (renal dosing)    #Dementia  - Hold PO meds, reintroduce based on s/s eval.    #HTN  - BP soft, hold home antihypertensive.    #CAD  - Hold ASA (given NPO).    #BPH  - Hold home meds (given NPO).    #MISC  #DIET: NPO until s/s eval  #DVTppx: SQH Q12h  #GIppx: NI  #Activity: Increase as tolerated  #CODE: FULL  #Disposition: from [facility], will likely return when stable.    #PENDING  - changed fluid to D5 1/2 NS  - f/u BMP at 4PM  - correct Mag  - switch abx to zosyn (renal dosing)   84-year-old male with past medical history of HTN, BPH, CAD, vitamin D def, and Alzheimer's dementia presents to the ED from SNF for abnormal lab result of Na 177. The patient is awake and active but non-verbal.    #Severe hypernatremia likely 2/2 dehydration/poor PO intake  - CMP with Na 175, Cl 142, BUN/Cr 91/2.4, serum osm 396.  - UA with mod blood (26 RBC), neg LE/nitrite (8 WBC), 21 casts, 4 epithelial cells.  - Shant 50, UOsm 812.  - Appears dry on exam.  - F/u nephro consult (Dr. Mondragon).  - changed fluid to D5 1/2 NS  - f/u BMP at 4PM  - correct Mag    #Possible aspiration pneumonia  - Triage VS: /63, HR 67, T 99.9F, SpO2 99% on RA.  - Labs: WBC 12.52k (83.8% neutrophil)  - ED interventions: 1L LR bolus, 1g ceftriaxone, 500mg azithromycin.  - CT chest non con: Right lower lobe multifocal consolidative and nodular opacities. Additional smaller nodular opacities in all 5 lobes. Findings most likely infectious in nature. Correlate with symptoms. Given the indicated concern for malignancy, follow up in one month is recommended for resolution and re-evaluation.  - Strict NPO until s/s evaluation.  - Aspiration precautions.  - F/u flu rsv covid swab (sent).  - F/u BCx, urine strep and legionella. Doubt sending sputum culture will be possible given that the patient does not follow commands.  - Aspiration precautions.  - repeat CT after PNA treatment  - op follow up for possible mets  - switch abx to zosyn (renal dosing)    #Dementia  - Hold PO meds, reintroduce based on s/s eval.    #HTN  - BP soft, hold home antihypertensive.    #CAD  - Hold ASA (given NPO).    #BPH  - Hold home meds (given NPO).    #MISC  #DIET: NPO until s/s eval  #DVTppx: SQH Q12h  #GIppx: NI  #Activity: Increase as tolerated  #CODE: FULL  #[facility] will [facility] return when stable.    #PENDING  - changed fluid to D5 1/2 NS  - f/u BMP at 4PM  - correct Mag  - switch abx to zosyn (renal dosing)   84-year-old male with past medical history of HTN, BPH, CAD, vitamin D def, and Alzheimer's dementia presents to the ED from SNF for abnormal lab result of Na 177. The patient is awake and active but non-verbal.    #Severe hypernatremia likely 2/2 dehydration/poor PO intake  - CMP with Na 175, Cl 142, BUN/Cr 91/2.4, serum osm 396.  - UA with mod blood (26 RBC), neg LE/nitrite (8 WBC), 21 casts, 4 epithelial cells.  - Shant 50, UOsm 812.  - Appears dry on exam.  - F/u nephro consult (Dr. Mondragon).  - changed fluid to D5 1/2 NS  - f/u BMP at 4PM    #Possible aspiration pneumonia  - Triage VS: /63, HR 67, T 99.9F, SpO2 99% on RA.  - Labs: WBC 12.52k (83.8% neutrophil)  - ED interventions: 1L LR bolus, 1g ceftriaxone, 500mg azithromycin.  - CT chest non con: Right lower lobe multifocal consolidative and nodular opacities. Additional smaller nodular opacities in all 5 lobes. Findings most likely infectious in nature. Correlate with symptoms. Given the indicated concern for malignancy, follow up in one month is recommended for resolution and re-evaluation.  - Strict NPO until s/s evaluation.  - Aspiration precautions.  - F/u flu rsv covid swab (sent).  - F/u BCx, urine strep and legionella. Doubt sending sputum culture will be possible given that the patient does not follow commands.  - Aspiration precautions.  - repeat CT after PNA treatment  - op follow up for possible mets  - switch abx to zosyn (renal dosing)    #Dementia  - Hold PO meds, reintroduce based on s/s eval.    #HTN  - BP soft, hold home antihypertensive.    #CAD  - Hold ASA (given NPO).    #BPH  - Hold home meds (given NPO).    #MISC  #DIET: NPO until s/s eval  #DVTppx: SQH Q12h  #GIppx: NI  #Activity: Increase as tolerated  #CODE: FULL  #Disposition: from [facility], will likely return when stable.    #PENDING  - changed fluid to D5 1/2 NS  - f/u BMP at 4PM  - correct Mag  - switch abx to zosyn (renal dosing)

## 2024-12-21 NOTE — CONSULT NOTE ADULT - ASSESSMENT
IMPRESSION:    Severe hypernatremia  Renal failure/ acute prerenal/ dehydration  Pneumonia/ highly aspiration  HO Dementia/ alzheimer    PLAN:    CNS: Avoid CNS depressant    HEENT:  Oral care    PULMONARY:  HOB @ 45 degrees, on RA aspiration precaution, keep SaO2 92 to 96%, Neb as needed    CARDIOVASCULAR: continue IVF, echo    GI: GI prophylaxis                                          Feeding speech and swallow eval    RENAL:  F/u  lytes.  Correct as needed. accurate I/O, trend Na    INFECTIOUS DISEASE: swab For MRSA if - dc vanco, unasyn    HEMATOLOGICAL:  DVT prophylaxis.    ENDOCRINE:  Follow up FS.  Insulin protocol if needed.    very poor prognosis  GOC  SDU

## 2024-12-21 NOTE — PROGRESS NOTE ADULT - SUBJECTIVE AND OBJECTIVE BOX
SUBJECTIVE/OVERNIGHT EVENTS  Today is hospital day 1d. This morning patient was seen and examined at bedside, resting comfortably in bed. No acute or major events overnight.    MEDICATIONS  STANDING MEDICATIONS  cefepime   IVPB 2000 milliGRAM(s) IV Intermittent every 24 hours  chlorhexidine 2% Cloths 1 Application(s) Topical daily  dextrose 5% + sodium chloride 0.45%. 1000 milliLiter(s) IV Continuous <Continuous>  heparin   Injectable 5000 Unit(s) SubCutaneous every 12 hours  metroNIDAZOLE  IVPB 500 milliGRAM(s) IV Intermittent every 8 hours  vancomycin  IVPB 750 milliGRAM(s) IV Intermittent every 24 hours    PRN MEDICATIONS    VITALS  T(F): 98.9 (12-21-24 @ 07:22), Max: 99.9 (12-20-24 @ 17:16)  HR: 45 (12-21-24 @ 07:22) (45 - 77)  BP: 101/53 (12-21-24 @ 07:22) (90/51 - 126/66)  RR: 19 (12-21-24 @ 07:22) (18 - 19)  SpO2: 99% (12-21-24 @ 07:22) (99% - 100%)    PHYSICAL EXAM  GENERAL: NAD, lying in bed comfortably, thin, frail  EYES: conjunctiva and sclera clear  NECK: Supple, no JVD  HEART: Regular rate and rhythm, no murmurs, rubs, or gallops  LUNGS: Unlabored respirations.  Clear to auscultation bilaterally, no crackles, wheezing, or rhonchi  ABDOMEN: Soft, nontender, nondistended, +BS  EXTREMITIES: 2+ peripheral pulses bilaterally. No clubbing, cyanosis, or edema  NERVOUS SYSTEM:  A&Ox1, no focal deficits     LABS             12.7   11.82 )-----------( 203      ( 12-21-24 @ 05:17 )             43.9     176  |  143  |  76  -------------------------<  81   12-21-24 @ 05:17  4.3  |  23  |  2.1    Ca      8.4     12-21-24 @ 05:17  Phos   2.7     12-21-24 @ 05:17  Mg     3.5     12-21-24 @ 05:17    TPro  6.5  /  Alb  2.5  /  TBili  0.3  /  DBili  x   /  AST  60  /  ALT  46  /  AlkPhos  100  /  GGT  x     12-21-24 @ 05:17        Urinalysis Basic - ( 21 Dec 2024 05:17 )    Color: x / Appearance: x / SG: x / pH: x  Gluc: 81 mg/dL / Ketone: x  / Bili: x / Urobili: x   Blood: x / Protein: x / Nitrite: x   Leuk Esterase: x / RBC: x / WBC x   Sq Epi: x / Non Sq Epi: x / Bacteria: x          Urinalysis with Rflx Culture (collected 20 Dec 2024 19:08)

## 2024-12-21 NOTE — PROGRESS NOTE ADULT - ATTENDING COMMENTS
83 yo M with hx of HTN, CAD, BPH and Alzheimer dementia  presents from NH for hypernatremia.     #Hypernatremia 2/2 dehydration  - Fluids switched to D5 1/2NS @ 80cc   -  monitor BMP BID  - Do not correct serum Na > 8mEq per 24hr.     #RLL PNA   #Suspect aspiration  - CT shows Right lower lobe multifocal consolidative and nodular opacities. Additional smaller nodular opacities in all 5 lobes. Findings most likely infectious in nature. Correlate with symptoms. Given the indicated concern for malignancy, follow up in one month is recommended for resolution and re-evaluation.  - Cont zosyn  - check BCx, MRSA nares  - aspiration precaution.   - NPO with speech and swallow eval.     HTN - hold anti-HTN medication for now. BP borderline.   Alzheimer dementia - c/w home med.   BPH - c/w home med.    DVT ppx: Lovenox SC    #Progress Note Handoff  Pending (specify): monitor Na, cont IV abx, s/s eval  Family discussion: team dw family regarding plan of care  Disposition: sdu 83 yo M with hx of HTN, CAD, BPH and Alzheimer dementia  presents from NH for hypernatremia.     #Hypernatremia 2/2 dehydration  - Fluids switched to D5 1/2NS @ 80cc   -  monitor BMP BID  - Do not correct serum Na > 8mEq per 24hr.     #RLL PNA   #Suspect aspiration  - CT shows Right lower lobe multifocal consolidative and nodular opacities. Additional smaller nodular opacities in all 5 lobes. Findings most likely infectious in nature. Correlate with symptoms. Given the indicated concern for malignancy, follow up in one month is recommended for resolution and re-evaluation  dw critcare  - Cont zosyn  - check BCx, MRSA nares  - aspiration precaution.   - NPO with speech and swallow eval.     HTN - hold anti-HTN medication for now. BP borderline.   Alzheimer dementia - c/w home med.   BPH - c/w home med.    DVT ppx: Lovenox SC    #Progress Note Handoff  Pending (specify): monitor Na, cont IV abx, s/s eval  Family discussion: team dannie family regarding plan of care  Disposition: sdu

## 2024-12-21 NOTE — ED ADULT NURSE REASSESSMENT NOTE - NS ED NURSE REASSESS COMMENT FT1
Received pt from prior RN. Pt resting in bed, no distress noted upon initial assessment. On cardiac monitor.

## 2024-12-22 LAB
ALBUMIN SERPL ELPH-MCNC: 2.5 G/DL — LOW (ref 3.5–5.2)
ALP SERPL-CCNC: 95 U/L — SIGNIFICANT CHANGE UP (ref 30–115)
ALT FLD-CCNC: 50 U/L — HIGH (ref 0–41)
ANION GAP SERPL CALC-SCNC: 10 MMOL/L — SIGNIFICANT CHANGE UP (ref 7–14)
ANION GAP SERPL CALC-SCNC: 6 MMOL/L — LOW (ref 7–14)
ANION GAP SERPL CALC-SCNC: 8 MMOL/L — SIGNIFICANT CHANGE UP (ref 7–14)
AST SERPL-CCNC: 70 U/L — HIGH (ref 0–41)
BASOPHILS # BLD AUTO: 0.03 K/UL — SIGNIFICANT CHANGE UP (ref 0–0.2)
BASOPHILS NFR BLD AUTO: 0.3 % — SIGNIFICANT CHANGE UP (ref 0–1)
BILIRUB SERPL-MCNC: 0.4 MG/DL — SIGNIFICANT CHANGE UP (ref 0.2–1.2)
BUN SERPL-MCNC: 43 MG/DL — HIGH (ref 10–20)
BUN SERPL-MCNC: 56 MG/DL — HIGH (ref 10–20)
BUN SERPL-MCNC: 61 MG/DL — CRITICAL HIGH (ref 10–20)
CALCIUM SERPL-MCNC: 7.4 MG/DL — LOW (ref 8.4–10.5)
CALCIUM SERPL-MCNC: 7.8 MG/DL — LOW (ref 8.4–10.5)
CALCIUM SERPL-MCNC: 8.1 MG/DL — LOW (ref 8.4–10.5)
CHLORIDE SERPL-SCNC: 134 MMOL/L — HIGH (ref 98–110)
CHLORIDE SERPL-SCNC: 136 MMOL/L — HIGH (ref 98–110)
CHLORIDE SERPL-SCNC: 139 MMOL/L — HIGH (ref 98–110)
CO2 SERPL-SCNC: 23 MMOL/L — SIGNIFICANT CHANGE UP (ref 17–32)
CO2 SERPL-SCNC: 24 MMOL/L — SIGNIFICANT CHANGE UP (ref 17–32)
CO2 SERPL-SCNC: 27 MMOL/L — SIGNIFICANT CHANGE UP (ref 17–32)
CREAT SERPL-MCNC: 1.7 MG/DL — HIGH (ref 0.7–1.5)
CREAT SERPL-MCNC: 1.8 MG/DL — HIGH (ref 0.7–1.5)
CREAT SERPL-MCNC: 2 MG/DL — HIGH (ref 0.7–1.5)
EGFR: 32 ML/MIN/1.73M2 — LOW
EGFR: 37 ML/MIN/1.73M2 — LOW
EGFR: 39 ML/MIN/1.73M2 — LOW
EOSINOPHIL # BLD AUTO: 0.14 K/UL — SIGNIFICANT CHANGE UP (ref 0–0.7)
EOSINOPHIL NFR BLD AUTO: 1.3 % — SIGNIFICANT CHANGE UP (ref 0–8)
GLUCOSE SERPL-MCNC: 125 MG/DL — HIGH (ref 70–99)
GLUCOSE SERPL-MCNC: 136 MG/DL — HIGH (ref 70–99)
GLUCOSE SERPL-MCNC: 136 MG/DL — HIGH (ref 70–99)
HCT VFR BLD CALC: 44.5 % — SIGNIFICANT CHANGE UP (ref 42–52)
HGB BLD-MCNC: 13.1 G/DL — LOW (ref 14–18)
IMM GRANULOCYTES NFR BLD AUTO: 0.7 % — HIGH (ref 0.1–0.3)
LYMPHOCYTES # BLD AUTO: 1.49 K/UL — SIGNIFICANT CHANGE UP (ref 1.2–3.4)
LYMPHOCYTES # BLD AUTO: 13.4 % — LOW (ref 20.5–51.1)
MAGNESIUM SERPL-MCNC: 2.9 MG/DL — HIGH (ref 1.8–2.4)
MCHC RBC-ENTMCNC: 29.4 G/DL — LOW (ref 32–37)
MCHC RBC-ENTMCNC: 30.5 PG — SIGNIFICANT CHANGE UP (ref 27–31)
MCV RBC AUTO: 103.5 FL — HIGH (ref 80–94)
MONOCYTES # BLD AUTO: 0.48 K/UL — SIGNIFICANT CHANGE UP (ref 0.1–0.6)
MONOCYTES NFR BLD AUTO: 4.3 % — SIGNIFICANT CHANGE UP (ref 1.7–9.3)
NEUTROPHILS # BLD AUTO: 8.9 K/UL — HIGH (ref 1.4–6.5)
NEUTROPHILS NFR BLD AUTO: 80 % — HIGH (ref 42.2–75.2)
NRBC # BLD: 0 /100 WBCS — SIGNIFICANT CHANGE UP (ref 0–0)
PHOSPHATE SERPL-MCNC: 2 MG/DL — LOW (ref 2.1–4.9)
PLATELET # BLD AUTO: 185 K/UL — SIGNIFICANT CHANGE UP (ref 130–400)
PMV BLD: 12.1 FL — HIGH (ref 7.4–10.4)
POTASSIUM SERPL-MCNC: 3.7 MMOL/L — SIGNIFICANT CHANGE UP (ref 3.5–5)
POTASSIUM SERPL-MCNC: 3.8 MMOL/L — SIGNIFICANT CHANGE UP (ref 3.5–5)
POTASSIUM SERPL-MCNC: 4 MMOL/L — SIGNIFICANT CHANGE UP (ref 3.5–5)
POTASSIUM SERPL-SCNC: 3.7 MMOL/L — SIGNIFICANT CHANGE UP (ref 3.5–5)
POTASSIUM SERPL-SCNC: 3.8 MMOL/L — SIGNIFICANT CHANGE UP (ref 3.5–5)
POTASSIUM SERPL-SCNC: 4 MMOL/L — SIGNIFICANT CHANGE UP (ref 3.5–5)
PROT SERPL-MCNC: 6.3 G/DL — SIGNIFICANT CHANGE UP (ref 6–8)
RBC # BLD: 4.3 M/UL — LOW (ref 4.7–6.1)
RBC # FLD: 14.3 % — SIGNIFICANT CHANGE UP (ref 11.5–14.5)
SODIUM SERPL-SCNC: 165 MMOL/L — CRITICAL HIGH (ref 135–146)
SODIUM SERPL-SCNC: 170 MMOL/L — CRITICAL HIGH (ref 135–146)
SODIUM SERPL-SCNC: 172 MMOL/L — CRITICAL HIGH (ref 135–146)
WBC # BLD: 11.12 K/UL — HIGH (ref 4.8–10.8)
WBC # FLD AUTO: 11.12 K/UL — HIGH (ref 4.8–10.8)

## 2024-12-22 PROCEDURE — 99222 1ST HOSP IP/OBS MODERATE 55: CPT

## 2024-12-22 PROCEDURE — 99233 SBSQ HOSP IP/OBS HIGH 50: CPT

## 2024-12-22 RX ORDER — SODIUM CHLORIDE 9 MG/ML
1000 INJECTION, SOLUTION INTRAVENOUS
Refills: 0 | Status: DISCONTINUED | OUTPATIENT
Start: 2024-12-22 | End: 2024-12-22

## 2024-12-22 RX ORDER — SODIUM CHLORIDE 9 MG/ML
1000 INJECTION, SOLUTION INTRAVENOUS
Refills: 0 | Status: DISCONTINUED | OUTPATIENT
Start: 2024-12-22 | End: 2024-12-23

## 2024-12-22 RX ADMIN — SODIUM CHLORIDE 100 MILLILITER(S): 9 INJECTION, SOLUTION INTRAVENOUS at 02:49

## 2024-12-22 RX ADMIN — PIPERACILLIN AND TAZOBACTAM 200 GRAM(S): 3; .375 INJECTION, POWDER, LYOPHILIZED, FOR SOLUTION INTRAVENOUS at 15:07

## 2024-12-22 RX ADMIN — PIPERACILLIN AND TAZOBACTAM 200 GRAM(S): 3; .375 INJECTION, POWDER, LYOPHILIZED, FOR SOLUTION INTRAVENOUS at 21:15

## 2024-12-22 RX ADMIN — SODIUM CHLORIDE 120 MILLILITER(S): 9 INJECTION, SOLUTION INTRAVENOUS at 12:29

## 2024-12-22 RX ADMIN — HEPARIN SODIUM 5000 UNIT(S): 1000 INJECTION, SOLUTION INTRAVENOUS; SUBCUTANEOUS at 05:56

## 2024-12-22 RX ADMIN — HEPARIN SODIUM 5000 UNIT(S): 1000 INJECTION, SOLUTION INTRAVENOUS; SUBCUTANEOUS at 17:58

## 2024-12-22 RX ADMIN — PIPERACILLIN AND TAZOBACTAM 200 GRAM(S): 3; .375 INJECTION, POWDER, LYOPHILIZED, FOR SOLUTION INTRAVENOUS at 07:06

## 2024-12-22 RX ADMIN — CHLORHEXIDINE GLUCONATE 1 APPLICATION(S): 1.2 RINSE ORAL at 11:13

## 2024-12-22 NOTE — CONSULT NOTE ADULT - ASSESSMENT
Hypernatremia - likely related to inability to take in free water sec. to dementia and possibly altered thirst mechanicms  ANGEL - volume depletion - urine studies most consistent with that  Bilateral pneumonia  Dementia  HTN    Suggest:  water deficit is 2.4 L - 50% in first 24 hours would be 1.2 L of free water and 2.4 L of 1/2 ns which he is getting but needs some for insensible loss  so would increase to 120 1/2 NS cc/hour  monitor Na for appropriate correction - would want to correct no more than 12 meq/24 hours  send urine Na/K  patient on IV antibiotics for pneumonia

## 2024-12-22 NOTE — PATIENT PROFILE ADULT - FALL HARM RISK - HARM RISK INTERVENTIONS

## 2024-12-22 NOTE — PATIENT PROFILE ADULT - HAS THE PATIENT RECEIVED THE INFLUENZA VACCINE THIS SEASON?
From: Chetna Fernandez  To: Mitch Christianson  Sent: 4/29/2021 9:03 AM CDT  Subject: Non-Urgent Medical Question    This message is being sent by Marychuy Fernandez on behalf of Chetna Fernandez.    Hello! I'm a little suspicious that chetna might have a UTI. Last week her teacher said she peed 3 times which is super unusual for her in a 3 hour period. Then yesterday she seemed to pee a lot and then last night she was up 2 times (10pm and 1am to ask us to take her to the bathroom) and was up again at 5am, came to my room crying (not sure if she went to the bathroom by herself or not). I asked her if it hurt to pee and she said no. So I'm not sure if this should be of concern? Let me know your thoughts.     Thanks!   unable to assess immunization status...

## 2024-12-22 NOTE — CONSULT NOTE ADULT - ASSESSMENT
85y/o M with dementia, with RIGHT hydrocele vs RIGHT inguinal hernia vs both    - Pt seen and examined at bedside with Dr. South, plan as per attending  - No acute urologic intervention at this time   - Obtain scrotal sonogram to identify whether this is a hydrocele or inguinal hernia as patient is noncooperative with exam and will not let go of his valsalva to allow possible hernia to reduce.   - Periodic bladder scans to ensure no urinary retention  - Continue care per primary team . 85y/o M with dementia, with RIGHT hydrocele vs RIGHT inguinal hernia vs both    - Pt seen and examined at bedside with Dr. South, plan as per attending  - No acute urologic intervention at this time   - Obtain scrotal sonogram to identify whether this is a hydrocele or inguinal hernia as patient is noncooperative with exam and will not let go of his valsalva to allow possible hernia to reduce.   - Periodic bladder scans to ensure no urinary retention  - Continue care per primary team .    pt seen on afternoon rounds. He was pushing so i could not tell if this was a hydrocele going up to the external ring or a hydrocele and a hernia  we will follow

## 2024-12-22 NOTE — PROGRESS NOTE ADULT - ASSESSMENT
83 yo M with hx of HTN, CAD, BPH and Alzheimer dementia  presents from NH for hypernatremia.     #Hypernatremia 2/2 dehydration  - Fluids switched to D5 1/2NS @ 120cc   -  monitor BMP BID  - Do not correct serum Na > 8mEq per 24hr.     #RLL PNA   #Suspect aspiration  - CT shows Right lower lobe multifocal consolidative and nodular opacities. Additional smaller nodular opacities in all 5 lobes. Findings most likely infectious in nature. Correlate with symptoms. Given the indicated concern for malignancy, follow up in one month is recommended for resolution and re-evaluation  dw critcare  - Cont zosyn  - check BCx, MRSA nares  - aspiration precaution.   - pureed diet    HTN - hold anti-HTN medication for now. BP borderline.   Alzheimer dementia - c/w home med.   BPH - c/w home med.    DVT ppx: Lovenox SC    #Progress Note Handoff  Pending (specify): monitor Na, cont IV abx  Family discussion: team dw family regarding plan of care  Disposition: sdu.

## 2024-12-22 NOTE — PATIENT PROFILE ADULT - NSTOBACCONEVERSMOKERY/N_GEN_A
Rx Refill Note  Requested Prescriptions     Pending Prescriptions Disp Refills    Pramipexole Dihydrochloride ER 1.5 MG tablet sustained-release 24 hour [Pharmacy Med Name: PRAMIPEXOLE ER 1.5 MG TABLET] 90 tablet 1     Sig: TAKE 1 CAPSULE BY MOUTH EVERY NIGHT. TAKE A FEW HOURS BEFORE BEDTIME      Last filled:12/29/23  Last office visit with prescribing clinician: 6/14/2023      Next office visit with prescribing clinician: 3/28/2024     Lissette Calloway MA  02/21/24, 14:56 EST    Denied-Filled on 12/29/23 with 3 refills.  Refill is to soon  
No

## 2024-12-22 NOTE — CONSULT NOTE ADULT - SUBJECTIVE AND OBJECTIVE BOX
Patient resident of [facility], found to have elevated Na 177 on lab work and sent to ED for evaluation.  not able to get a history from patient.  He does not speak English.  did not respond to interpreters Cantonese and Mandarin and also has dementia.  patient did look comfortable and was not grimacing or in any pain.   review of HP shows PMH as follows:  Dementia  HTN  CAD  BPH    Vital Signs Last 24 Hrs  T(C): 36 (22 Dec 2024 08:00), Max: 36.6 (21 Dec 2024 16:28)  T(F): 96.8 (22 Dec 2024 08:00), Max: 97.8 (21 Dec 2024 16:28)  HR: 63 (22 Dec 2024 08:00) (60 - 96)  BP: 120/52 (22 Dec 2024 08:00) (114/56 - 131/101)  BP(mean): 72 (22 Dec 2024 08:00) (72 - 104)  RR: 18 (22 Dec 2024 08:00) (18 - 20)  SpO2: 96% (22 Dec 2024 08:00) (92% - 100%)    Parameters below as of 22 Dec 2024 04:00  Patient On (Oxygen Delivery Method): room air     conj pink, no jaundice  neck no JVD. supple. no bruits  lungs clear to auscultation. no crackles and no wheezing  heart regular rate and rhythm. no murmur or gallop  abd BS pos. soft nontender  extremities no edema. poor skin turgor    Labs:                     13.1   11.12 )-----------( 185      ( 22 Dec 2024 05:44 )             44.5     12-22    170[HH]  |  136[H]  |  56[H]  ----------------------------<  136[H]  3.7   |  24  |  2.0[H]    Ca    7.8[L]      22 Dec 2024 05:44  Phos  2.0     12-22  Mg     2.9     12-22    TPro  6.3  /  Alb  2.5[L]  /  TBili  0.4  /  DBili  x   /  AST  70[H]  /  ALT  50[H]  /  AlkPhos  95  12-22    urine s.g. 1030  urine osmolality over 800    CXR and CT chest show bilateral pulmonary opacities likely infectious    MEDICATIONS  (STANDING):  chlorhexidine 2% Cloths 1 Application(s) Topical daily  dextrose 5% + sodium chloride 0.45%. 1000 milliLiter(s) (100 mL/Hr) IV Continuous <Continuous>  heparin   Injectable 5000 Unit(s) SubCutaneous every 12 hours  piperacillin/tazobactam IVPB.. 2.25 Gram(s) IV Intermittent every 8 hours

## 2024-12-22 NOTE — PROGRESS NOTE ADULT - SUBJECTIVE AND OBJECTIVE BOX
HORNEMAIDA  84y, Male  Allergy: No Known Allergies    Hospital Day: 2d    Patient seen and examined. No acute events overnight    PMH/PSH:  PAST MEDICAL & SURGICAL HISTORY:  CAD (coronary artery disease)      Central pain syndrome      BPH (benign prostatic hyperplasia)      Benign essential HTN      Vitamin D deficiency      Alzheimer disease          VITALS:  T(F): 96.8 (12-22-24 @ 08:00), Max: 97.8 (12-21-24 @ 16:28)  HR: 63 (12-22-24 @ 08:00)  BP: 120/52 (12-22-24 @ 08:00) (114/56 - 131/101)  RR: 18 (12-22-24 @ 08:00)  SpO2: 96% (12-22-24 @ 08:00)    TESTS & MEASUREMENTS:  Weight (Kg): 63.5 (12-21-24 @ 22:30)  BMI (kg/m2): 22.6 (12-21)    12-21-24 @ 07:01  -  12-22-24 @ 07:00  --------------------------------------------------------  IN: 520 mL / OUT: 300 mL / NET: 220 mL                            13.1   11.12 )-----------( 185      ( 22 Dec 2024 05:44 )             44.5       12-22    170[HH]  |  136[H]  |  56[H]  ----------------------------<  136[H]  3.7   |  24  |  2.0[H]    Ca    7.8[L]      22 Dec 2024 05:44  Phos  2.0     12-22  Mg     2.9     12-22    TPro  6.3  /  Alb  2.5[L]  /  TBili  0.4  /  DBili  x   /  AST  70[H]  /  ALT  50[H]  /  AlkPhos  95  12-22    LIVER FUNCTIONS - ( 22 Dec 2024 05:44 )  Alb: 2.5 g/dL / Pro: 6.3 g/dL / ALK PHOS: 95 U/L / ALT: 50 U/L / AST: 70 U/L / GGT: x                 Culture - Blood (collected 12-20-24 @ 21:45)  Source: .Blood BLOOD  Preliminary Report (12-22-24 @ 07:01):    No growth at 24 hours    Culture - Blood (collected 12-20-24 @ 21:45)  Source: .Blood BLOOD  Preliminary Report (12-22-24 @ 07:01):    No growth at 24 hours    Urinalysis with Rflx Culture (collected 12-20-24 @ 19:08)      Urinalysis Basic - ( 22 Dec 2024 05:44 )    Color: x / Appearance: x / SG: x / pH: x  Gluc: 136 mg/dL / Ketone: x  / Bili: x / Urobili: x   Blood: x / Protein: x / Nitrite: x   Leuk Esterase: x / RBC: x / WBC x   Sq Epi: x / Non Sq Epi: x / Bacteria: x        RADIOLOGY & ADDITIONAL TESTS:    RECENT DIAGNOSTIC ORDERS:  Basic Metabolic Panel: 23:30 (12-22-24 @ 09:09)  Basic Metabolic Panel: 16:00 (12-22-24 @ 09:09)  Basic Metabolic Panel: 16:00 (12-22-24 @ 09:09)  Basic Metabolic Panel: 20:00 (12-22-24 @ 09:08)  Diet, Pureed (12-21-24 @ 17:38)      MEDICATIONS:  MEDICATIONS  (STANDING):  chlorhexidine 2% Cloths 1 Application(s) Topical daily  dextrose 5% + sodium chloride 0.45%. 1000 milliLiter(s) (120 mL/Hr) IV Continuous <Continuous>  heparin   Injectable 5000 Unit(s) SubCutaneous every 12 hours  piperacillin/tazobactam IVPB.. 2.25 Gram(s) IV Intermittent every 8 hours    MEDICATIONS  (PRN):      HOME MEDICATIONS:  acetaminophen 325 mg oral tablet (12-20)  aspirin 81 mg oral tablet, chewable (12-20)  Benicar 40 mg oral tablet (12-20)  cholecalciferol 125 mcg (5000 intl units) oral tablet (12-20)  donepezil 10 mg oral tablet (12-20)  finasteride 5 mg oral tablet (12-20)  Nephplex Rx oral tablet (12-20)  Seroquel 25 mg oral tablet (12-20)      REVIEW OF SYSTEMS:  All other review of systems is negative unless indicated above.     PHYSICAL EXAM:  PHYSICAL EXAM:  GENERAL: NAD  HEAD:  Atraumatic, Normocephalic  NECK: Supple, No JVD  CHEST/LUNG: Clear to auscultation bilaterally; No wheeze  HEART: Regular rate and rhythm; No murmurs, rubs, or gallops  ABDOMEN: Soft, Nontender, Nondistended; Bowel sounds present  EXTREMITIES:  2+ Peripheral Pulses, No clubbing, cyanosis, or edema  SKIN: No rashes or lesions

## 2024-12-22 NOTE — CONSULT NOTE ADULT - NS ATTEND AMEND GEN_ALL_CORE FT
I personally saw and examined the patient, reviewed the chart and available data. I discussed the situation with the patients nurse and JOHN ANDERS's. I also reviewed and/or amended the note as necessary.

## 2024-12-22 NOTE — CONSULT NOTE ADULT - SUBJECTIVE AND OBJECTIVE BOX
UROLOGY CONSULT: Pt is an 85y/o M with alzheimer's dementia admitted with hypernatremia. Urology consulted for scrotal edema. Pt seen and examined at bedside with Dr. South. Pt unable to participate in hx 2/2 dementia. Bedside RN states scrotum appeared more enlarged today as opposed to yesterday.    PAST MEDICAL & SURGICAL HISTORY:  CAD (coronary artery disease)  Central pain syndrome  BPH (benign prostatic hyperplasia)  Benign essential HTN  Vitamin D deficiency  Alzheimer disease    MEDICATIONS  (STANDING):  chlorhexidine 2% Cloths 1 Application(s) Topical daily  dextrose 5% + sodium chloride 0.45%. 1000 milliLiter(s) (120 mL/Hr) IV Continuous <Continuous>  heparin   Injectable 5000 Unit(s) SubCutaneous every 12 hours  piperacillin/tazobactam IVPB.. 2.25 Gram(s) IV Intermittent every 8 hours    Allergies  No Known Allergies    REVIEW OF SYSTEMS   [ x ] Due to altered mental status/intubation, subjective information were not able to be obtained from patient. History was obtained, to the extent possible, from review of the chart and collateral sources of information.    Vital Signs Last 24 Hrs  T(C): 36.6 (22 Dec 2024 12:00), Max: 36.6 (21 Dec 2024 16:28)  T(F): 97.9 (22 Dec 2024 12:00), Max: 97.9 (22 Dec 2024 12:00)  HR: 42 (22 Dec 2024 12:00) (42 - 96)  BP: 122/57 (22 Dec 2024 12:00) (114/65 - 131/101)  BP(mean): 82 (22 Dec 2024 12:00) (72 - 104)  RR: 18 (22 Dec 2024 12:00) (18 - 20)  SpO2: 100% (22 Dec 2024 12:00) (92% - 100%)    PHYSICAL EXAM:  GEN: NAD, confused  SKIN: Good color, non diaphoretic.  RESP: Non-labored breathing. No use of accessory muscles.  CARDIO: +S1/S2  ABDO: Soft, NT/ND, bladder minimally palpable to percussion, no suprapubic tenderness  BACK: No CVAT B/L  : + Non circumcised male, +phimosis. B/L descended testicles x 2, no scrotal fluctuance or edema noted; +R inguinal hernia vs R hydrocele vs both.  EXT: MOE x 4    I&O's Summary    21 Dec 2024 07:01  -  22 Dec 2024 07:00  --------------------------------------------------------  IN: 520 mL / OUT: 300 mL / NET: 220 mL    LABS:             13.1   11.12 )-----------( 185      ( 22 Dec 2024 05:44 )             44.5     170[HH]  |  136[H]  |  56[H]  ----------------------------<  136[H]  3.7   |  24  |  2.0[H]    Ca    7.8[L]      22 Dec 2024 05:44  Phos  2.0     12-22  Mg     2.9     12-22  TPro  6.3  /  Alb  2.5[L]  /  TBili  0.4  /  DBili  x   /  AST  70[H]  /  ALT  50[H]  /  AlkPhos  95  12-22    Urinalysis Basic - ( 22 Dec 2024 05:44 )  Color: x / Appearance: x / SG: x / pH: x  Gluc: 136 mg/dL / Ketone: x  / Bili: x / Urobili: x   Blood: x / Protein: x / Nitrite: x   Leuk Esterase: x / RBC: x / WBC x   Sq Epi: x / Non Sq Epi: x / Bacteria: x    RADIOLOGY & ADDITIONAL STUDIES:  ACC: [id number] EXAM:  US KIDNEYS AND BLADDER   ORDERED BY: MARYBETH FERNANDEZ     PROCEDURE DATE:  12/21/2024    INTERPRETATION:  CLINICAL HISTORY / REASON FOR EXAM: Renal failure  COMPARISON: None  PROCEDURE: Retroperitoneal ultrasound was performed.    FINDINGS:  RIGHT KIDNEY: 8.4 cm. No renal mass, hydronephrosis or calculi.  LEFT KIDNEY: 8.1 cm. No renal mass, hydronephrosis or calculi. Lower pole   cyst measuring 0.6 x 0.7 cm.  URINARY BLADDER: Decompressed urinary bladder.  OTHER: Partially imaged right pleural effusion.    IMPRESSION:  No sonographic evidence of hydronephrosis bilaterally.    TOD RAMJIT MD; Attending Radiologist  This document has been electronically signed. Dec 21 2024  9:26AM UROLOGY CONSULT: Pt is an 83y/o M with alzheimer's dementia admitted with hypernatremia. Urology consulted for scrotal edema. Pt seen and examined at bedside with Dr. South. Pt unable to participate in hx 2/2 dementia. Bedside RN states scrotum appeared more enlarged today as opposed to yesterday.    PAST MEDICAL & SURGICAL HISTORY:  CAD (coronary artery disease)  Central pain syndrome  BPH (benign prostatic hyperplasia)  Benign essential HTN  Vitamin D deficiency  Alzheimer disease    MEDICATIONS  (STANDING):  chlorhexidine 2% Cloths 1 Application(s) Topical daily  dextrose 5% + sodium chloride 0.45%. 1000 milliLiter(s) (120 mL/Hr) IV Continuous <Continuous>  heparin   Injectable 5000 Unit(s) SubCutaneous every 12 hours  piperacillin/tazobactam IVPB.. 2.25 Gram(s) IV Intermittent every 8 hours    Allergies  No Known Allergies    REVIEW OF SYSTEMS   [ x ] Due to altered mental status/intubation, subjective information were not able to be obtained from patient. History was obtained, to the extent possible, from review of the chart and collateral sources of information.    Vital Signs Last 24 Hrs  T(C): 36.6 (22 Dec 2024 12:00), Max: 36.6 (21 Dec 2024 16:28)  T(F): 97.9 (22 Dec 2024 12:00), Max: 97.9 (22 Dec 2024 12:00)  HR: 42 (22 Dec 2024 12:00) (42 - 96)  BP: 122/57 (22 Dec 2024 12:00) (114/65 - 131/101)  BP(mean): 82 (22 Dec 2024 12:00) (72 - 104)  RR: 18 (22 Dec 2024 12:00) (18 - 20)  SpO2: 100% (22 Dec 2024 12:00) (92% - 100%)    PHYSICAL EXAM:  GEN: NAD, confused  SKIN: Good color, non diaphoretic.  RESP: Non-labored breathing. No use of accessory muscles.  CARDIO: +S1/S2  ABDO: Soft, NT/ND, bladder minimally palpable to percussion, no suprapubic tenderness (bladder scan was 208ml ? how soon after voiding)  BACK: No CVAT B/L  : + Non circumcised male, foreskin snug but retractable. B/L descended testicles x 2, no scrotal wall fluctuance or edema noted; left testicle atrophic, +R inguinal hernia vs R hydrocele vs both.  EXT: MOE x 4    I&O's Summary    21 Dec 2024 07:01  -  22 Dec 2024 07:00  --------------------------------------------------------  IN: 520 mL / OUT: 300 mL / NET: 220 mL    LABS:             13.1   11.12 )-----------( 185      ( 22 Dec 2024 05:44 )             44.5     170[HH]  |  136[H]  |  56[H]  ----------------------------<  136[H]  3.7   |  24  |  2.0[H]    Ca    7.8[L]      22 Dec 2024 05:44  Phos  2.0     12-22  Mg     2.9     12-22  TPro  6.3  /  Alb  2.5[L]  /  TBili  0.4  /  DBili  x   /  AST  70[H]  /  ALT  50[H]  /  AlkPhos  95  12-22    Urinalysis Basic - ( 22 Dec 2024 05:44 )  Color: x / Appearance: x / SG: x / pH: x  Gluc: 136 mg/dL / Ketone: x  / Bili: x / Urobili: x   Blood: x / Protein: x / Nitrite: x   Leuk Esterase: x / RBC: x / WBC x   Sq Epi: x / Non Sq Epi: x / Bacteria: x    RADIOLOGY & ADDITIONAL STUDIES:  ACC: [id number] EXAM:  US KIDNEYS AND BLADDER   ORDERED BY: MARYBETH FERNANDEZ     PROCEDURE DATE:  12/21/2024    INTERPRETATION:  CLINICAL HISTORY / REASON FOR EXAM: Renal failure  COMPARISON: None  PROCEDURE: Retroperitoneal ultrasound was performed.    FINDINGS:  RIGHT KIDNEY: 8.4 cm. No renal mass, hydronephrosis or calculi.  LEFT KIDNEY: 8.1 cm. No renal mass, hydronephrosis or calculi. Lower pole   cyst measuring 0.6 x 0.7 cm.  URINARY BLADDER: Decompressed urinary bladder.  OTHER: Partially imaged right pleural effusion.    IMPRESSION:  No sonographic evidence of hydronephrosis bilaterally.    TOD BRADSHAW MD; Attending Radiologist  This document has been electronically signed. Dec 21 2024  9:26AM

## 2024-12-22 NOTE — PROGRESS NOTE ADULT - ASSESSMENT
IMPRESSION:    Severe hypernatremia slowly improving  Renal failure/ acute prerenal/ dehydration improving  Pneumonia/ highly aspiration  HO Dementia/ alzheimer    PLAN:    CNS: Avoid CNS depressant    HEENT:  Oral care    PULMONARY:  HOB @ 45 degrees, on RA aspiration precaution, keep SaO2 92 to 96%, Neb as needed    CARDIOVASCULAR: continue IVF, echo    GI: GI prophylaxis                                          Feeding speech and swallow eval    RENAL:  F/u  lytes.  Correct as needed. accurate I/O, trend Na    INFECTIOUS DISEASE: finish course of ABX    HEMATOLOGICAL:  DVT prophylaxis.    ENDOCRINE:  Follow up FS.  Insulin protocol if needed.    very poor prognosis  GOC  FLOOR if Na keep improving  spoke with team

## 2024-12-22 NOTE — PROGRESS NOTE ADULT - SUBJECTIVE AND OBJECTIVE BOX
Over Night Events: events noted, on RA, afebrile    PHYSICAL EXAM    ICU Vital Signs Last 24 Hrs  T(C): 36.2 (22 Dec 2024 04:00), Max: 37.2 (21 Dec 2024 07:22)  T(F): 97.2 (22 Dec 2024 04:00), Max: 98.9 (21 Dec 2024 07:22)  HR: 96 (22 Dec 2024 04:00) (45 - 96)  BP: 126/63 (22 Dec 2024 04:00) (101/53 - 131/101)  BP(mean): 78 (22 Dec 2024 04:00) (78 - 104)  RR: 20 (22 Dec 2024 04:00) (18 - 20)  SpO2: 100% (22 Dec 2024 04:00) (92% - 100%)    O2 Parameters below as of 22 Dec 2024 04:00  Patient On (Oxygen Delivery Method): room air            General: ill looking  Lungs: dec bs both bases  Cardiovascular: DANE 2.6  Abdomen: Soft, Positive BS  Extremities: No clubbing   Neurological: Non focal       12-21-24 @ 07:01  -  12-22-24 @ 06:23  --------------------------------------------------------  IN:    dextrose 5% + sodium chloride 0.45%: 320 mL    dextrose 5% + sodium chloride 0.45%: 200 mL  Total IN: 520 mL    OUT:    Voided (mL): 300 mL  Total OUT: 300 mL    Total NET: 220 mL          LABS:                          12.7   11.82 )-----------( 203      ( 21 Dec 2024 05:17 )             43.9                                               12-22    172[HH]  |  139[H]  |  61[HH]  ----------------------------<  136[H]  4.0   |  27  |  1.8[H]    Ca    8.1[L]      22 Dec 2024 00:49  Phos  2.7     12-21  Mg     3.5     12-21    TPro  6.5  /  Alb  2.5[L]  /  TBili  0.3  /  DBili  x   /  AST  60[H]  /  ALT  46[H]  /  AlkPhos  100  12-21                                             Urinalysis Basic - ( 22 Dec 2024 00:49 )    Color: x / Appearance: x / SG: x / pH: x  Gluc: 136 mg/dL / Ketone: x  / Bili: x / Urobili: x   Blood: x / Protein: x / Nitrite: x   Leuk Esterase: x / RBC: x / WBC x   Sq Epi: x / Non Sq Epi: x / Bacteria: x                                                  LIVER FUNCTIONS - ( 21 Dec 2024 05:17 )  Alb: 2.5 g/dL / Pro: 6.5 g/dL / ALK PHOS: 100 U/L / ALT: 46 U/L / AST: 60 U/L / GGT: x                                                  Urinalysis with Rflx Culture (collected 20 Dec 2024 19:08)                                                                                           MEDICATIONS  (STANDING):  chlorhexidine 2% Cloths 1 Application(s) Topical daily  dextrose 5% + sodium chloride 0.45%. 1000 milliLiter(s) (100 mL/Hr) IV Continuous <Continuous>  heparin   Injectable 5000 Unit(s) SubCutaneous every 12 hours  piperacillin/tazobactam IVPB.. 2.25 Gram(s) IV Intermittent every 8 hours

## 2024-12-23 DIAGNOSIS — N17.9 ACUTE KIDNEY FAILURE, UNSPECIFIED: ICD-10-CM

## 2024-12-23 DIAGNOSIS — I10 ESSENTIAL (PRIMARY) HYPERTENSION: ICD-10-CM

## 2024-12-23 DIAGNOSIS — E03.9 HYPOTHYROIDISM, UNSPECIFIED: ICD-10-CM

## 2024-12-23 DIAGNOSIS — I25.10 ATHEROSCLEROTIC HEART DISEASE OF NATIVE CORONARY ARTERY WITHOUT ANGINA PECTORIS: ICD-10-CM

## 2024-12-23 DIAGNOSIS — Z87.438 PERSONAL HISTORY OF OTHER DISEASES OF MALE GENITAL ORGANS: ICD-10-CM

## 2024-12-23 DIAGNOSIS — Z79.899 OTHER LONG TERM (CURRENT) DRUG THERAPY: ICD-10-CM

## 2024-12-23 DIAGNOSIS — R00.1 BRADYCARDIA, UNSPECIFIED: ICD-10-CM

## 2024-12-23 DIAGNOSIS — E87.0 HYPEROSMOLALITY AND HYPERNATREMIA: ICD-10-CM

## 2024-12-23 DIAGNOSIS — N43.3 HYDROCELE, UNSPECIFIED: ICD-10-CM

## 2024-12-23 LAB
ALBUMIN SERPL ELPH-MCNC: 2.3 G/DL — LOW (ref 3.5–5.2)
ALP SERPL-CCNC: 83 U/L — SIGNIFICANT CHANGE UP (ref 30–115)
ALT FLD-CCNC: 42 U/L — HIGH (ref 0–41)
ANION GAP SERPL CALC-SCNC: 10 MMOL/L — SIGNIFICANT CHANGE UP (ref 7–14)
ANION GAP SERPL CALC-SCNC: 9 MMOL/L — SIGNIFICANT CHANGE UP (ref 7–14)
AST SERPL-CCNC: 57 U/L — HIGH (ref 0–41)
BASOPHILS # BLD AUTO: 0.03 K/UL — SIGNIFICANT CHANGE UP (ref 0–0.2)
BASOPHILS NFR BLD AUTO: 0.3 % — SIGNIFICANT CHANGE UP (ref 0–1)
BILIRUB SERPL-MCNC: 0.4 MG/DL — SIGNIFICANT CHANGE UP (ref 0.2–1.2)
BUN SERPL-MCNC: 29 MG/DL — HIGH (ref 10–20)
BUN SERPL-MCNC: 37 MG/DL — HIGH (ref 10–20)
CALCIUM SERPL-MCNC: 7.2 MG/DL — LOW (ref 8.4–10.4)
CALCIUM SERPL-MCNC: 7.7 MG/DL — LOW (ref 8.4–10.5)
CHLORIDE SERPL-SCNC: 129 MMOL/L — HIGH (ref 98–110)
CHLORIDE SERPL-SCNC: 131 MMOL/L — HIGH (ref 98–110)
CO2 SERPL-SCNC: 20 MMOL/L — SIGNIFICANT CHANGE UP (ref 17–32)
CO2 SERPL-SCNC: 24 MMOL/L — SIGNIFICANT CHANGE UP (ref 17–32)
CREAT SERPL-MCNC: 1.6 MG/DL — HIGH (ref 0.7–1.5)
CREAT SERPL-MCNC: 1.8 MG/DL — HIGH (ref 0.7–1.5)
EGFR: 37 ML/MIN/1.73M2 — LOW
EGFR: 42 ML/MIN/1.73M2 — LOW
EOSINOPHIL # BLD AUTO: 0.22 K/UL — SIGNIFICANT CHANGE UP (ref 0–0.7)
EOSINOPHIL NFR BLD AUTO: 2 % — SIGNIFICANT CHANGE UP (ref 0–8)
GLUCOSE SERPL-MCNC: 112 MG/DL — HIGH (ref 70–99)
GLUCOSE SERPL-MCNC: 98 MG/DL — SIGNIFICANT CHANGE UP (ref 70–99)
HCT VFR BLD CALC: 40.2 % — LOW (ref 42–52)
HGB BLD-MCNC: 11.9 G/DL — LOW (ref 14–18)
IMM GRANULOCYTES NFR BLD AUTO: 0.8 % — HIGH (ref 0.1–0.3)
LYMPHOCYTES # BLD AUTO: 1.69 K/UL — SIGNIFICANT CHANGE UP (ref 1.2–3.4)
LYMPHOCYTES # BLD AUTO: 15.7 % — LOW (ref 20.5–51.1)
MAGNESIUM SERPL-MCNC: 2.7 MG/DL — HIGH (ref 1.8–2.4)
MCHC RBC-ENTMCNC: 29.6 G/DL — LOW (ref 32–37)
MCHC RBC-ENTMCNC: 30.7 PG — SIGNIFICANT CHANGE UP (ref 27–31)
MCV RBC AUTO: 103.6 FL — HIGH (ref 80–94)
MONOCYTES # BLD AUTO: 0.41 K/UL — SIGNIFICANT CHANGE UP (ref 0.1–0.6)
MONOCYTES NFR BLD AUTO: 3.8 % — SIGNIFICANT CHANGE UP (ref 1.7–9.3)
NEUTROPHILS # BLD AUTO: 8.34 K/UL — HIGH (ref 1.4–6.5)
NEUTROPHILS NFR BLD AUTO: 77.4 % — HIGH (ref 42.2–75.2)
NRBC # BLD: 0 /100 WBCS — SIGNIFICANT CHANGE UP (ref 0–0)
PHOSPHATE SERPL-MCNC: 2.2 MG/DL — SIGNIFICANT CHANGE UP (ref 2.1–4.9)
PLATELET # BLD AUTO: 158 K/UL — SIGNIFICANT CHANGE UP (ref 130–400)
PMV BLD: 11.9 FL — HIGH (ref 7.4–10.4)
POTASSIUM SERPL-MCNC: 3.8 MMOL/L — SIGNIFICANT CHANGE UP (ref 3.5–5)
POTASSIUM SERPL-MCNC: 4.1 MMOL/L — SIGNIFICANT CHANGE UP (ref 3.5–5)
POTASSIUM SERPL-SCNC: 3.8 MMOL/L — SIGNIFICANT CHANGE UP (ref 3.5–5)
POTASSIUM SERPL-SCNC: 4.1 MMOL/L — SIGNIFICANT CHANGE UP (ref 3.5–5)
PROT SERPL-MCNC: 6.1 G/DL — SIGNIFICANT CHANGE UP (ref 6–8)
RBC # BLD: 3.88 M/UL — LOW (ref 4.7–6.1)
RBC # FLD: 14 % — SIGNIFICANT CHANGE UP (ref 11.5–14.5)
SODIUM SERPL-SCNC: 159 MMOL/L — HIGH (ref 135–146)
SODIUM SERPL-SCNC: 162 MMOL/L — CRITICAL HIGH (ref 135–146)
WBC # BLD: 10.78 K/UL — SIGNIFICANT CHANGE UP (ref 4.8–10.8)
WBC # FLD AUTO: 10.78 K/UL — SIGNIFICANT CHANGE UP (ref 4.8–10.8)

## 2024-12-23 PROCEDURE — 99233 SBSQ HOSP IP/OBS HIGH 50: CPT

## 2024-12-23 PROCEDURE — 93010 ELECTROCARDIOGRAM REPORT: CPT

## 2024-12-23 PROCEDURE — 99231 SBSQ HOSP IP/OBS SF/LOW 25: CPT

## 2024-12-23 PROCEDURE — 76870 US EXAM SCROTUM: CPT | Mod: 26

## 2024-12-23 RX ORDER — SODIUM CHLORIDE 9 MG/ML
1000 INJECTION, SOLUTION INTRAVENOUS
Refills: 0 | Status: DISCONTINUED | OUTPATIENT
Start: 2024-12-23 | End: 2024-12-25

## 2024-12-23 RX ORDER — ASPIRIN 81 MG
81 TABLET, DELAYED RELEASE (ENTERIC COATED) ORAL DAILY
Refills: 0 | Status: DISCONTINUED | OUTPATIENT
Start: 2024-12-23 | End: 2025-01-05

## 2024-12-23 RX ORDER — QUETIAPINE FUMARATE 100 MG/1
12.5 TABLET, FILM COATED ORAL
Refills: 0 | Status: DISCONTINUED | OUTPATIENT
Start: 2024-12-23 | End: 2025-01-04

## 2024-12-23 RX ORDER — FINASTERIDE 5 MG
5 TABLET ORAL DAILY
Refills: 0 | Status: DISCONTINUED | OUTPATIENT
Start: 2024-12-23 | End: 2025-01-05

## 2024-12-23 RX ORDER — HEPARIN SODIUM 1000 [USP'U]/ML
5000 INJECTION, SOLUTION INTRAVENOUS; SUBCUTANEOUS EVERY 12 HOURS
Refills: 0 | Status: DISCONTINUED | OUTPATIENT
Start: 2024-12-23 | End: 2025-01-05

## 2024-12-23 RX ADMIN — PIPERACILLIN AND TAZOBACTAM 200 GRAM(S): 3; .375 INJECTION, POWDER, LYOPHILIZED, FOR SOLUTION INTRAVENOUS at 05:13

## 2024-12-23 RX ADMIN — CHLORHEXIDINE GLUCONATE 1 APPLICATION(S): 1.2 RINSE ORAL at 11:22

## 2024-12-23 RX ADMIN — SODIUM CHLORIDE 100 MILLILITER(S): 9 INJECTION, SOLUTION INTRAVENOUS at 23:49

## 2024-12-23 RX ADMIN — PIPERACILLIN AND TAZOBACTAM 200 GRAM(S): 3; .375 INJECTION, POWDER, LYOPHILIZED, FOR SOLUTION INTRAVENOUS at 23:25

## 2024-12-23 RX ADMIN — QUETIAPINE FUMARATE 12.5 MILLIGRAM(S): 100 TABLET, FILM COATED ORAL at 18:03

## 2024-12-23 RX ADMIN — HEPARIN SODIUM 5000 UNIT(S): 1000 INJECTION, SOLUTION INTRAVENOUS; SUBCUTANEOUS at 18:03

## 2024-12-23 RX ADMIN — HEPARIN SODIUM 5000 UNIT(S): 1000 INJECTION, SOLUTION INTRAVENOUS; SUBCUTANEOUS at 05:13

## 2024-12-23 RX ADMIN — SODIUM CHLORIDE 120 MILLILITER(S): 9 INJECTION, SOLUTION INTRAVENOUS at 09:27

## 2024-12-23 RX ADMIN — PIPERACILLIN AND TAZOBACTAM 200 GRAM(S): 3; .375 INJECTION, POWDER, LYOPHILIZED, FOR SOLUTION INTRAVENOUS at 14:10

## 2024-12-23 NOTE — PROGRESS NOTE ADULT - SUBJECTIVE AND OBJECTIVE BOX
Patient is a 84y old  Male who presents with a chief complaint of Hypernatremia (22 Dec 2024 09:18)        Over Night Events:  on RA.  Pff pressors.          ROS:     All ROS are negative except HPI         PHYSICAL EXAM    ICU Vital Signs Last 24 Hrs  T(C): 36.1 (23 Dec 2024 08:00), Max: 36.6 (22 Dec 2024 12:00)  T(F): 97 (23 Dec 2024 08:00), Max: 97.9 (22 Dec 2024 12:00)  HR: 42 (23 Dec 2024 08:00) (42 - 79)  BP: 129/54 (23 Dec 2024 08:00) (100/54 - 151/65)  BP(mean): 78 (23 Dec 2024 08:00) (78 - 94)  ABP: --  ABP(mean): --  RR: 18 (23 Dec 2024 08:00) (18 - 18)  SpO2: 100% (23 Dec 2024 08:00) (97% - 100%)    O2 Parameters below as of 22 Dec 2024 16:00  Patient On (Oxygen Delivery Method): room air            CONSTITUTIONAL:   NAD    ENT:   Airway patent,   Mouth with normal mucosa.       EYES:   Pupils equal,   Round and reactive to light.    CARDIAC:   Normal rate,   Regular rhythm.    No edema    RESPIRATORY:   No wheezing  Bilateral BS  Normal chest expansion  Not tachypneic,  No use of accessory muscles    GASTROINTESTINAL:  Abdomen soft,   Non-tender,   No guarding,   + BS    MUSCULOSKELETAL:   Range of motion is not limited,  No clubbing, cyanosis    NEUROLOGICAL:   Alert    SKIN:   Skin normal color for race,   Warm and dry  No evidence of rash.        12-22-24 @ 07:01  -  12-23-24 @ 07:00  --------------------------------------------------------  IN:    dextrose 5% + sodium chloride 0.45%: 960 mL    dextrose 5% + sodium chloride 0.45%: 400 mL  Total IN: 1360 mL    OUT:  Total OUT: 0 mL    Total NET: 1360 mL          LABS:                            11.9   10.78 )-----------( 158      ( 23 Dec 2024 05:32 )             40.2                                               12-23    162[HH]  |  131[H]  |  37[H]  ----------------------------<  112[H]  4.1   |  24  |  1.8[H]    Ca    7.7[L]      23 Dec 2024 05:32  Phos  2.2     12-23  Mg     2.7     12-23    TPro  6.1  /  Alb  2.3[L]  /  TBili  0.4  /  DBili  x   /  AST  57[H]  /  ALT  42[H]  /  AlkPhos  83  12-23                                             Urinalysis Basic - ( 23 Dec 2024 05:32 )    Color: x / Appearance: x / SG: x / pH: x  Gluc: 112 mg/dL / Ketone: x  / Bili: x / Urobili: x   Blood: x / Protein: x / Nitrite: x   Leuk Esterase: x / RBC: x / WBC x   Sq Epi: x / Non Sq Epi: x / Bacteria: x                                                  LIVER FUNCTIONS - ( 23 Dec 2024 05:32 )  Alb: 2.3 g/dL / Pro: 6.1 g/dL / ALK PHOS: 83 U/L / ALT: 42 U/L / AST: 57 U/L / GGT: x                                                  Culture - Blood (collected 20 Dec 2024 21:45)  Source: .Blood BLOOD  Preliminary Report (23 Dec 2024 07:01):    No growth at 48 Hours    Culture - Blood (collected 20 Dec 2024 21:45)  Source: .Blood BLOOD  Preliminary Report (23 Dec 2024 07:01):    No growth at 48 Hours    Urinalysis with Rflx Culture (collected 20 Dec 2024 19:08)                                                                                           MEDICATIONS  (STANDING):  chlorhexidine 2% Cloths 1 Application(s) Topical daily  dextrose 5% + sodium chloride 0.45%. 1000 milliLiter(s) (120 mL/Hr) IV Continuous <Continuous>  dextrose 5% + sodium chloride 0.45%. 1000 milliLiter(s) (120 mL/Hr) IV Continuous <Continuous>  heparin   Injectable 5000 Unit(s) SubCutaneous every 12 hours  piperacillin/tazobactam IVPB.. 2.25 Gram(s) IV Intermittent every 8 hours    MEDICATIONS  (PRN):      New X-rays reviewed:                                                                                  ECHO

## 2024-12-23 NOTE — PROGRESS NOTE ADULT - PROBLEM SELECTOR PLAN 4
appears sinus  ekg poor study  during sleep; resolves upon waking  avoid avn blockers  tsh  monitor on tele

## 2024-12-23 NOTE — PROGRESS NOTE ADULT - SUBJECTIVE AND OBJECTIVE BOX
Nephrology progress note     no distress but not communicative    ON events/Subjective:     Allergies:  No Known Allergies    Hospital Medications:   MEDICATIONS  (STANDING):  chlorhexidine 2% Cloths 1 Application(s) Topical daily  dextrose 5% + sodium chloride 0.45%. 1000 milliLiter(s) (120 mL/Hr) IV Continuous <Continuous>  dextrose 5% + sodium chloride 0.45%. 1000 milliLiter(s) (120 mL/Hr) IV Continuous <Continuous>  heparin   Injectable 5000 Unit(s) SubCutaneous every 12 hours  piperacillin/tazobactam IVPB.. 2.25 Gram(s) IV Intermittent every 8 hours    REVIEW OF SYSTEMS:  appears comfortable  All other review of systems is negative unless indicated above.    VITALS:  T(F): 97 (12-23-24 @ 08:00), Max: 97.9 (12-22-24 @ 12:00)  HR: 42 (12-23-24 @ 08:00)  BP: 129/54 (12-23-24 @ 08:00)  RR: 18 (12-23-24 @ 08:00)  SpO2: 100% (12-23-24 @ 08:00)  Wt(kg): --    12-21 @ 07:01  -  12-22 @ 07:00  --------------------------------------------------------  IN: 520 mL / OUT: 300 mL / NET: 220 mL    12-22 @ 07:01  -  12-23 @ 07:00  --------------------------------------------------------  IN: 1360 mL / OUT: 0 mL / NET: 1360 mL        PHYSICAL EXAM:  Constitutional: appears volume depleted  HEENT: dry mucosa  Neck: No JVD  Respiratory: CTAB, no wheezes, rales or rhonchi  Cardiovascular: S1, S2, RRR  Gastrointestinal: BS+, soft, NT/ND  Extremities: No cyanosis or clubbing. No peripheral edema  Neurological: A/O x 3, no focal deficits  Psychiatric: Normal mood, normal affect  : No CVA tenderness. No nieto.   Skin: No rashes  Vascular Access:    LABS:  12-23    162[HH]  |  131[H]  |  37[H]  ----------------------------<  112[H]  4.1   |  24  |  1.8[H]    Ca    7.7[L]      23 Dec 2024 05:32  Phos  2.2     12-23  Mg     2.7     12-23    TPro  6.1  /  Alb  2.3[L]  /  TBili  0.4  /  DBili      /  AST  57[H]  /  ALT  42[H]  /  AlkPhos  83  12-23                          11.9   10.78 )-----------( 158      ( 23 Dec 2024 05:32 )             40.2       Urine Studies:  Creatinine Trend: 1.8<--, 1.7<--, 2.0<--, 1.8<--, 2.1<--, 2.2<--  Urinalysis Basic - ( 23 Dec 2024 05:32 )    Color:  / Appearance:  / SG:  / pH:   Gluc: 112 mg/dL / Ketone:   / Bili:  / Urobili:    Blood:  / Protein:  / Nitrite:    Leuk Esterase:  / RBC:  / WBC    Sq Epi:  / Non Sq Epi:  / Bacteria:       Sodium, Random Urine: 50.0 mmoL/L (12-20 @ 19:08)  Osmolality, Random Urine: 812 mos/kg (12-20 @ 19:08)    ·	seenn in surgical intensive care unit  ·	admitted from NH with pneumonia and severe hypernatremia and ANGEL  ·	hypernatremia likley from volume depletion from decreased po intake - is improving on ivf  ·	ANGEL improved on ivf  ·	hemodynamics stable  ·	pneumonia (? aspiration ) on zosyn    1) ok with D51/2 NS at 120 cc/hr - suggest target sodium correction 10 mmol/L per day  2) abx per MICU  3) am labs  4) feed as tolerates

## 2024-12-23 NOTE — PROGRESS NOTE ADULT - NSPROGADDITIONALINFOA_GEN_ALL_CORE
#Progress Note Handoff  Pending (specify): hypernatremia, malcolm, f/u uro  Family discussion:   Disposition: snf

## 2024-12-23 NOTE — PROGRESS NOTE ADULT - SUBJECTIVE AND OBJECTIVE BOX
INTERVAL HPI/OVERNIGHT EVENTS:    SUBJECTIVE: Patient seen and examined at bedside.     unable to obtain ros    OBJECTIVE:    VITAL SIGNS:  Vital Signs Last 24 Hrs  T(C): 36 (23 Dec 2024 12:00), Max: 36.5 (22 Dec 2024 20:00)  T(F): 96.8 (23 Dec 2024 12:00), Max: 97.7 (22 Dec 2024 20:00)  HR: 52 (23 Dec 2024 12:00) (42 - 79)  BP: 162/67 (23 Dec 2024 12:00) (100/54 - 162/67)  BP(mean): 96 (23 Dec 2024 12:00) (78 - 96)  RR: 18 (23 Dec 2024 12:00) (18 - 18)  SpO2: 100% (23 Dec 2024 12:00) (97% - 100%)    Parameters below as of 22 Dec 2024 16:00  Patient On (Oxygen Delivery Method): room air          PHYSICAL EXAM:    General: NAD  HEENT: NC/AT; PERRL, clear conjunctiva  Neck: supple  Respiratory: CTA b/l  Cardiovascular: +S1/S2; RRR  Abdomen: soft, NT/ND; +BS x4  Extremities: WWP, 2+ peripheral pulses b/l; no LE edema  Skin: normal color and turgor; no rash  Neurological:    MEDICATIONS:  MEDICATIONS  (STANDING):  aspirin  chewable 81 milliGRAM(s) Oral daily  chlorhexidine 2% Cloths 1 Application(s) Topical daily  dextrose 5% + sodium chloride 0.45%. 1000 milliLiter(s) (100 mL/Hr) IV Continuous <Continuous>  finasteride 5 milliGRAM(s) Oral daily  heparin   Injectable 5000 Unit(s) SubCutaneous every 12 hours  piperacillin/tazobactam IVPB.. 2.25 Gram(s) IV Intermittent every 8 hours  QUEtiapine 12.5 milliGRAM(s) Oral two times a day    MEDICATIONS  (PRN):      ALLERGIES:  Allergies    No Known Allergies    Intolerances        LABS:                        11.9   10.78 )-----------( 158      ( 23 Dec 2024 05:32 )             40.2     Hemoglobin: 11.9 g/dL (12-23 @ 05:32)  Hemoglobin: 13.1 g/dL (12-22 @ 05:44)  Hemoglobin: 12.7 g/dL (12-21 @ 05:17)  Hemoglobin: 13.1 g/dL (12-20 @ 18:45)    CBC Full  -  ( 23 Dec 2024 05:32 )  WBC Count : 10.78 K/uL  RBC Count : 3.88 M/uL  Hemoglobin : 11.9 g/dL  Hematocrit : 40.2 %  Platelet Count - Automated : 158 K/uL  Mean Cell Volume : 103.6 fL  Mean Cell Hemoglobin : 30.7 pg  Mean Cell Hemoglobin Concentration : 29.6 g/dL  Auto Neutrophil # : 8.34 K/uL  Auto Lymphocyte # : 1.69 K/uL  Auto Monocyte # : 0.41 K/uL  Auto Eosinophil # : 0.22 K/uL  Auto Basophil # : 0.03 K/uL  Auto Neutrophil % : 77.4 %  Auto Lymphocyte % : 15.7 %  Auto Monocyte % : 3.8 %  Auto Eosinophil % : 2.0 %  Auto Basophil % : 0.3 %    12-23    162[HH]  |  131[H]  |  37[H]  ----------------------------<  112[H]  4.1   |  24  |  1.8[H]    Ca    7.7[L]      23 Dec 2024 05:32  Phos  2.2     12-23  Mg     2.7     12-23    TPro  6.1  /  Alb  2.3[L]  /  TBili  0.4  /  DBili  x   /  AST  57[H]  /  ALT  42[H]  /  AlkPhos  83  12-23    Creatinine Trend: 1.8<--, 1.7<--, 2.0<--, 1.8<--, 2.1<--, 2.2<--  LIVER FUNCTIONS - ( 23 Dec 2024 05:32 )  Alb: 2.3 g/dL / Pro: 6.1 g/dL / ALK PHOS: 83 U/L / ALT: 42 U/L / AST: 57 U/L / GGT: x               hs Troponin:            Urinalysis Basic - ( 23 Dec 2024 05:32 )    Color: x / Appearance: x / SG: x / pH: x  Gluc: 112 mg/dL / Ketone: x  / Bili: x / Urobili: x   Blood: x / Protein: x / Nitrite: x   Leuk Esterase: x / RBC: x / WBC x   Sq Epi: x / Non Sq Epi: x / Bacteria: x      CSF:                      EKG:   MICROBIOLOGY:    Culture - Blood (collected 20 Dec 2024 21:45)  Source: .Blood BLOOD  Preliminary Report (23 Dec 2024 07:01):    No growth at 48 Hours    Culture - Blood (collected 20 Dec 2024 21:45)  Source: .Blood BLOOD  Preliminary Report (23 Dec 2024 07:01):    No growth at 48 Hours    Urinalysis with Rflx Culture (collected 20 Dec 2024 19:08)      IMAGING:      Labs, imaging, EKG personally reviewed    RADIOLOGY & ADDITIONAL TESTS: Reviewed.

## 2024-12-23 NOTE — PROGRESS NOTE ADULT - SUBJECTIVE AND OBJECTIVE BOX
UROLOGY: Pt is an 83y/o M a/w hypernatremia,  following for scrotal edema. Pt seen and examined at bedside with Dr. Nobles. Unable to obtain hx 2/2 AMS     REVIEW OF SYSTEMS   [ x ] Due to altered mental status/intubation, subjective information were not able to be obtained from patient. History was obtained, to the extent possible, from review of the chart and collateral sources of information.    Vital Signs Last 24 Hrs  T(C): 35.6 (23 Dec 2024 16:00), Max: 36.5 (22 Dec 2024 20:00)  T(F): 96 (23 Dec 2024 16:00), Max: 97.7 (22 Dec 2024 20:00)  HR: 39 (23 Dec 2024 16:00) (39 - 79)  BP: 110/53 (23 Dec 2024 16:00) (100/54 - 162/67)  BP(mean): 77 (23 Dec 2024 16:00) (77 - 96)  RR: 18 (23 Dec 2024 16:00) (18 - 18)  SpO2: 100% (23 Dec 2024 16:00) (97% - 100%)    PHYSICAL EXAM:  GEN: NAD, confused  SKIN: Good color, non diaphoretic.  RESP: Non-labored breathing. No use of accessory muscles.  CARDIO: +S1/S2  ABDO: Soft, NT/ND, bladder minimally palpable to percussion, no suprapubic tenderness  BACK: No CVAT B/L  : + Non circumcised male, foreskin snug but retractable. B/L descended testicles x 2, no scrotal wall fluctuance or edema noted; left testicle atrophic, + R hydrocele    LABS:             11.9   10.78 )-----------( 158      ( 23 Dec 2024 05:32 )             40.2     162[HH]  |  131[H]  |  37[H]  ----------------------------<  112[H]  4.1   |  24  |  1.8[H]    ACC: [id number] EXAM:  US SCROTUM AND CONTENTS   ORDERED BY: JANNA SAAVEDRA     PROCEDURE DATE:  12/23/2024    INTERPRETATION:  CLINICAL INFORMATION: Swelling. Assess for right   hydrocele versus right inguinal hernia.  COMPARISON: None available.  TECHNIQUE: Testicular ultrasound utilizing color and spectral Doppler.    FINDINGS:  RIGHT:  Right testis: 3.1 cm x 1.9 cm x 2.0 cm. Normal echogenicity and   echotexture with no masses or areas of architectural distortion. Normal   arterial and venous blood flow pattern.  Right epididymis: Poorly visualized.  Right hydrocele: Massive right hydrocele containing debris, total volume   of 188 cc.  Right varicocele: None.  LEFT:  Left testis: 3.2 cm x 1.4 cm x 2.1 cm. Normal echogenicity and   echotexture with no masses or areas of architectural distortion. Normal   arterial and venous blood flow pattern.  Left epididymis: Within normal limits.  Left hydrocele: None.  Left varicocele: None.    IMPRESSION:  Massive right hydrocele containing debris, total volume of 188 cc.   Differential includes hemorrhage versus infection.    RENE ARANA MD; Attending Radiologist  This document has been electronically signed. Dec 23 2024 10:20AM

## 2024-12-23 NOTE — PROGRESS NOTE ADULT - PROBLEM SELECTOR PLAN 3
ct chest with R opacities  no hypoxia, satting well on ra  rvp neg  zosyn  sputum cx, legionella, strep, mrsa

## 2024-12-23 NOTE — PROGRESS NOTE ADULT - ASSESSMENT
83y/o M with RIGHT hydrocele     - Pt seen and examined at bedside with Dr. Nobles, plan as per attending  - No acute urologic intervention at this time   - Recall prn .

## 2024-12-23 NOTE — PROGRESS NOTE ADULT - ASSESSMENT
IMPRESSION:    Severe hypernatremia slowly improving  ANGEL  pre renal improving   Pneumonia likely aspiration  HO Dementia/ alzheimer    PLAN:    CNS: Avoid CNS depressant    HEENT:  Oral care    PULMONARY:  HOB @ 45 degrees, on RA aspiration precaution, keep SaO2 92 to 96%.  Will need CT chest in 6 weeks as OP     CARDIOVASCULAR: Avoid overload.  Gentle hydration while NPO.      GI: GI prophylaxis                                          Speech and swallow eval noted.  M Ight need alternative means of feeding     RENAL:  F/u  lytes.  Correct as needed. Avoid over correction     INFECTIOUS DISEASE: finish course of ABX    HEMATOLOGICAL:  DVT prophylaxis.  Monitor CBC     ENDOCRINE:  Follow up FS.  Insulin protocol if needed.    very poor prognosis  [facility]  spoke with team  KASSY

## 2024-12-23 NOTE — CHART NOTE - NSCHARTNOTEFT_GEN_A_CORE
Transfer Note    Transfer from: SICU    Transfer to: ( x ) Medicine    (  ) Telemetry    (  ) RCU                               (  ) Palliative    (  ) Stroke Unit    (  ) MICU    (  ) __________________    HPI / SICU COURSE:    A 84-year-old male with past medical history of HTN, BPH, CAD, vitamin D def, and Alzheimer's dementia presents to the ED from SNF for abnormal lab result of Na 177. The patient is awake and active but non-verbal.  Called son on both the numbers provided on NH paperwork, he answered but said no English --> called on both numbers again with Cantonese  but he did not answer.  Also unsure if language is Cantonese or Mandarin but the patient did not respond to questions using either .  Cantonese: [id number], [id number].  Mandarin: [id number].    NKA per NH paperwork.    Triage VS: /63, HR 67, T 99.9F, SpO2 99% on RA.  Labs: WBC 12.52k (83.8% neutrophil), CMP with Na 175, Cl 142, BUN/Cr 91/2.4, AST/ALT 95/57, Mag 3.6, serum osm 396.  UA with mod blood (26 RBC), neg LE/nitrite (8 WBC), 21 casts, 4 epithelial cells.  Shant 50, UOsm 812.    CXR (pending official read) appears to have right sided basilar opacity.    ED interventions: 1L LR bolus, 1g ceftriaxone, 500mg azithromycin,     He was continued on zosyn for the PNA. BCx neg, RVP neg.  For the hyperNA, nephro was consulted and he was continued on D5NS0.45% 120cc/h. Sodium is improving.   Patient has scrotal swelling and underwent a scrotal US showing hydrocele with debris. Urology informed of the result.    Vital Signs Last 24 Hrs  T(C): 36 (23 Dec 2024 12:00), Max: 36.5 (22 Dec 2024 20:00)  T(F): 96.8 (23 Dec 2024 12:00), Max: 97.7 (22 Dec 2024 20:00)  HR: 52 (23 Dec 2024 12:00) (42 - 79)  BP: 162/67 (23 Dec 2024 12:00) (100/54 - 162/67)  BP(mean): 96 (23 Dec 2024 12:00) (78 - 96)  RR: 18 (23 Dec 2024 12:00) (18 - 18)  SpO2: 100% (23 Dec 2024 12:00) (97% - 100%)    Parameters below as of 22 Dec 2024 16:00  Patient On (Oxygen Delivery Method): room air        I&O's Summary    22 Dec 2024 07:01  -  23 Dec 2024 07:00  --------------------------------------------------------  IN: 1360 mL / OUT: 0 mL / NET: 1360 mL        Physical Exam:   GENERAL: NAD, lying in bed comfortably, thin, frail  EYES: conjunctiva and sclera clear  NECK: Supple, no JVD  HEART: Regular rate and rhythm, no murmurs, rubs, or gallops  LUNGS: Unlabored respirations.  Clear to auscultation bilaterally, no crackles, wheezing, or rhonchi  ABDOMEN: Soft, nontender, nondistended, +BS  EXTREMITIES: 2+ peripheral pulses bilaterally. No clubbing, cyanosis, or edema  NERVOUS SYSTEM:  A&Ox0, unable to communicate with patient despite using mandarin and Cantonese interpreters.       LABS:                               11.9   10.78 )-----------( 158      ( 23 Dec 2024 05:32 )             40.2           162[HH]  |  131[H]  |  37[H]  ----------------------------<  112[H]  4.1   |  24  |  1.8[H]    Ca    7.7[L]      23 Dec 2024 05:32  Phos  2.2       Mg     2.7         TPro  6.1  /  Alb  2.3[L]  /  TBili  0.4  /  DBili  x   /  AST  57[H]  /  ALT  42[H]  /  AlkPhos  83                  EC Lead ECG:   Ventricular Rate 77 BPM    Atrial Rate 77 BPM    P-R Interval 140 ms    QRS Duration 72 ms    Q-T Interval 440 ms    QTC Calculation(Bazett) 497 ms    P Axis 61 degrees    R Axis 64 degrees    T Axis 61 degrees    Diagnosis Line Normal sinus rhythmwith sinus arrhythmia  Normal ECG    Confirmed by Kami Maya ([id number]) on 2024 11:34:38 AM (24 @ 20:31)    Telemetry:    Imaging:      ASSESSMENT & PLAN:     85 yo M with hx of HTN, CAD, BPH and Alzheimer dementia  presents from NH for hypernatremia.     #Hypernatremia 2/2 dehydration  - Na 175 --> 170 --> 162  - Shant 50, UOsm 812  - Nephro consulted  - On IV D5NS0.45 120cc/h   - Monitor BMP BID  - Do not correct serum Na > 8mEq per 24hr     #RLL PNA possibly 2/2 aspiration  - CT shows Right lower lobe multifocal consolidative and nodular opacities. Additional smaller nodular opacities in all 5 lobes. Findings most likely infectious in nature. Correlate with symptoms. Given the indicated concern for malignancy, follow up in one month is recommended for resolution and re-evaluation  - Cont zosyn  - BCx neg, RVP neg. MRSA pending.  - Aspiration precaution.   - S&S cs: pureed diet    #Bradycardia  - HR dropped 39-40s when asleep  - BP stable  - Monitor and EKG showed sinus bradycardia  - Improvement when awake  - Transfer to telemetry     #HTN   - hold anti-HTN medication for now.     #Alzheimer dementia   - Hold donepezil for now.  - C/w seroquel 12.5mg BID    #BPH - c/w finasteride 5mg od    DVT ppx: heparin  GI ppx: not indicated  AAT  Diet: pureed  Dispo: telemetery Transfer Note    Transfer from: SICU    Transfer to: ( x ) Medicine    (  ) Telemetry    (  ) RCU                               (  ) Palliative    (  ) Stroke Unit    (  ) MICU    (  ) __________________    HPI / SICU COURSE:    A 84-year-old male with past medical history of HTN, BPH, CAD, vitamin D def, and Alzheimer's dementia presents to the ED from SNF for abnormal lab result of Na 177. The patient is awake and active but non-verbal.  Called son on both the numbers provided on NH paperwork, he answered but said no English --> called on both numbers again with Cantonese  but he did not answer.  Also unsure if language is Cantonese or Mandarin but the patient did not respond to questions using either .  Cantonese: [id number], [id number].  Mandarin: [id number].    NKA per NH paperwork.    Triage VS: /63, HR 67, T 99.9F, SpO2 99% on RA.  Labs: WBC 12.52k (83.8% neutrophil), CMP with Na 175, Cl 142, BUN/Cr 91/2.4, AST/ALT 95/57, Mag 3.6, serum osm 396.  UA with mod blood (26 RBC), neg LE/nitrite (8 WBC), 21 casts, 4 epithelial cells.  Shant 50, UOsm 812.    CXR (pending official read) appears to have right sided basilar opacity.    ED interventions: 1L LR bolus, 1g ceftriaxone, 500mg azithromycin,     He was continued on zosyn for the PNA. BCx neg, RVP neg.  For the hyperNA, nephro was consulted and he was continued on D5NS0.45% 120cc/h. Sodium is improving.   Patient has scrotal swelling and underwent a scrotal US showing hydrocele with debris. Urology informed of the result.    Vital Signs Last 24 Hrs  T(C): 36 (23 Dec 2024 12:00), Max: 36.5 (22 Dec 2024 20:00)  T(F): 96.8 (23 Dec 2024 12:00), Max: 97.7 (22 Dec 2024 20:00)  HR: 52 (23 Dec 2024 12:00) (42 - 79)  BP: 162/67 (23 Dec 2024 12:00) (100/54 - 162/67)  BP(mean): 96 (23 Dec 2024 12:00) (78 - 96)  RR: 18 (23 Dec 2024 12:00) (18 - 18)  SpO2: 100% (23 Dec 2024 12:00) (97% - 100%)    Parameters below as of 22 Dec 2024 16:00  Patient On (Oxygen Delivery Method): room air        I&O's Summary    22 Dec 2024 07:01  -  23 Dec 2024 07:00  --------------------------------------------------------  IN: 1360 mL / OUT: 0 mL / NET: 1360 mL        Physical Exam:   GENERAL: NAD, lying in bed comfortably, thin, frail  EYES: conjunctiva and sclera clear  NECK: Supple, no JVD  HEART: Regular rate and rhythm, no murmurs, rubs, or gallops  LUNGS: Unlabored respirations.  Clear to auscultation bilaterally, no crackles, wheezing, or rhonchi  ABDOMEN: Soft, nontender, nondistended, +BS  EXTREMITIES: 2+ peripheral pulses bilaterally. No clubbing, cyanosis, or edema  NERVOUS SYSTEM:  A&Ox0, unable to communicate with patient despite using mandarin and Cantonese interpreters.       LABS:                               11.9   10.78 )-----------( 158      ( 23 Dec 2024 05:32 )             40.2           162[HH]  |  131[H]  |  37[H]  ----------------------------<  112[H]  4.1   |  24  |  1.8[H]    Ca    7.7[L]      23 Dec 2024 05:32  Phos  2.2       Mg     2.7         TPro  6.1  /  Alb  2.3[L]  /  TBili  0.4  /  DBili  x   /  AST  57[H]  /  ALT  42[H]  /  AlkPhos  83                  EC Lead ECG:   Ventricular Rate 77 BPM    Atrial Rate 77 BPM    P-R Interval 140 ms    QRS Duration 72 ms    Q-T Interval 440 ms    QTC Calculation(Bazett) 497 ms    P Axis 61 degrees    R Axis 64 degrees    T Axis 61 degrees    Diagnosis Line Normal sinus rhythmwith sinus arrhythmia  Normal ECG    Confirmed by Kami Maya ([id number]) on 2024 11:34:38 AM (24 @ 20:31)    Telemetry:    Imaging:      ASSESSMENT & PLAN:     85 yo M with hx of HTN, CAD, BPH and Alzheimer dementia  presents from NH for hypernatremia.     #Hypernatremia 2/2 dehydration  - Na 175 --> 170 --> 162  - Shant 50, UOsm 812  - Nephro consulted  - On IV D5NS0.45 120cc/h   - Monitor BMP BID  - Do not correct serum Na > 8mEq per 24hr     #RLL PNA possibly 2/2 aspiration  - CT shows Right lower lobe multifocal consolidative and nodular opacities. Additional smaller nodular opacities in all 5 lobes. Findings most likely infectious in nature. Correlate with symptoms. Given the indicated concern for malignancy, follow up in one month is recommended for resolution and re-evaluation  - Cont zosyn  - BCx neg, RVP neg. MRSA pending.  - Aspiration precaution.   - S&S cs: pureed diet    #ANGLE 2/2 prerenal 2/2 poor PO intake  - Crea 2.4 (baseline 1.2) --> 1.8  - C/w IVF    #Hydrocele  - Scrotal swelling --> urology consulted to r/o inguinal hernia vs hydrocele  - Scrotal US done: Massive right hydrocele containing debris, total volume of 188 cc. Differential includes hemorrhage versus infection.  - Urology informed --> no acute intervention. FU OP    #Macrocytic anemia  - Hb 11.9, .6  - FU B12, folate    #Bradycardia  - HR dropped 39-40s when asleep  - BP stable  - Monitor and EKG showed sinus bradycardia  - Improvement when awake  - FU TSH  - Transfer to telemetry     #HTN   - hold anti-HTN medication for now.     #Alzheimer dementia   - Hold donepezil for now.  - C/w seroquel 12.5mg BID    #BPH - c/w finasteride 5mg od    DVT ppx: heparin  GI ppx: not indicated  AAT  Diet: pureed  Dispo: telemetery

## 2024-12-23 NOTE — PROGRESS NOTE ADULT - NS ATTEND AMEND GEN_ALL_CORE FT
Labs and imaging were personally reviewed and interpreted by me and discussed with the patient.  Scrotal US reviewed and interpreted. Shows right hydrocele measuring ~188cc of fluid with debris. No suspicious lesions or concerns for any infectious process. No need for any urgent surgical intervention.

## 2024-12-24 DIAGNOSIS — Z71.89 OTHER SPECIFIED COUNSELING: ICD-10-CM

## 2024-12-24 DIAGNOSIS — Z51.5 ENCOUNTER FOR PALLIATIVE CARE: ICD-10-CM

## 2024-12-24 LAB
ALBUMIN SERPL ELPH-MCNC: 2.2 G/DL — LOW (ref 3.5–5.2)
ALBUMIN SERPL ELPH-MCNC: 2.2 G/DL — LOW (ref 3.5–5.2)
ALP SERPL-CCNC: 75 U/L — SIGNIFICANT CHANGE UP (ref 30–115)
ALP SERPL-CCNC: 78 U/L — SIGNIFICANT CHANGE UP (ref 30–115)
ALT FLD-CCNC: 36 U/L — SIGNIFICANT CHANGE UP (ref 0–41)
ALT FLD-CCNC: 40 U/L — SIGNIFICANT CHANGE UP (ref 0–41)
ANION GAP SERPL CALC-SCNC: 6 MMOL/L — LOW (ref 7–14)
ANION GAP SERPL CALC-SCNC: 6 MMOL/L — LOW (ref 7–14)
AST SERPL-CCNC: 53 U/L — HIGH (ref 0–41)
AST SERPL-CCNC: 61 U/L — HIGH (ref 0–41)
BASOPHILS # BLD AUTO: 0.03 K/UL — SIGNIFICANT CHANGE UP (ref 0–0.2)
BASOPHILS NFR BLD AUTO: 0.4 % — SIGNIFICANT CHANGE UP (ref 0–1)
BILIRUB SERPL-MCNC: 0.4 MG/DL — SIGNIFICANT CHANGE UP (ref 0.2–1.2)
BILIRUB SERPL-MCNC: 0.4 MG/DL — SIGNIFICANT CHANGE UP (ref 0.2–1.2)
BUN SERPL-MCNC: 20 MG/DL — SIGNIFICANT CHANGE UP (ref 10–20)
BUN SERPL-MCNC: 22 MG/DL — HIGH (ref 10–20)
CALCIUM SERPL-MCNC: 7.4 MG/DL — LOW (ref 8.4–10.5)
CALCIUM SERPL-MCNC: 7.5 MG/DL — LOW (ref 8.4–10.5)
CHLORIDE SERPL-SCNC: 123 MMOL/L — HIGH (ref 98–110)
CHLORIDE SERPL-SCNC: 123 MMOL/L — HIGH (ref 98–110)
CO2 SERPL-SCNC: 25 MMOL/L — SIGNIFICANT CHANGE UP (ref 17–32)
CO2 SERPL-SCNC: 26 MMOL/L — SIGNIFICANT CHANGE UP (ref 17–32)
CREAT SERPL-MCNC: 1.4 MG/DL — SIGNIFICANT CHANGE UP (ref 0.7–1.5)
CREAT SERPL-MCNC: 1.7 MG/DL — HIGH (ref 0.7–1.5)
EGFR: 39 ML/MIN/1.73M2 — LOW
EGFR: 50 ML/MIN/1.73M2 — LOW
EOSINOPHIL # BLD AUTO: 0.24 K/UL — SIGNIFICANT CHANGE UP (ref 0–0.7)
EOSINOPHIL NFR BLD AUTO: 3.3 % — SIGNIFICANT CHANGE UP (ref 0–8)
FOLATE SERPL-MCNC: 10.9 NG/ML — SIGNIFICANT CHANGE UP
GLUCOSE SERPL-MCNC: 107 MG/DL — HIGH (ref 70–99)
GLUCOSE SERPL-MCNC: 115 MG/DL — HIGH (ref 70–99)
HCT VFR BLD CALC: 35.8 % — LOW (ref 42–52)
HCT VFR BLD CALC: 36.1 % — LOW (ref 42–52)
HGB BLD-MCNC: 10.9 G/DL — LOW (ref 14–18)
HGB BLD-MCNC: 11 G/DL — LOW (ref 14–18)
IMM GRANULOCYTES NFR BLD AUTO: 0.8 % — HIGH (ref 0.1–0.3)
LYMPHOCYTES # BLD AUTO: 1.84 K/UL — SIGNIFICANT CHANGE UP (ref 1.2–3.4)
LYMPHOCYTES # BLD AUTO: 25.2 % — SIGNIFICANT CHANGE UP (ref 20.5–51.1)
MAGNESIUM SERPL-MCNC: 2.1 MG/DL — SIGNIFICANT CHANGE UP (ref 1.8–2.4)
MAGNESIUM SERPL-MCNC: 2.3 MG/DL — SIGNIFICANT CHANGE UP (ref 1.8–2.4)
MCHC RBC-ENTMCNC: 30.4 G/DL — LOW (ref 32–37)
MCHC RBC-ENTMCNC: 30.4 PG — SIGNIFICANT CHANGE UP (ref 27–31)
MCHC RBC-ENTMCNC: 30.5 G/DL — LOW (ref 32–37)
MCHC RBC-ENTMCNC: 30.8 PG — SIGNIFICANT CHANGE UP (ref 27–31)
MCV RBC AUTO: 101.1 FL — HIGH (ref 80–94)
MCV RBC AUTO: 99.7 FL — HIGH (ref 80–94)
MONOCYTES # BLD AUTO: 0.36 K/UL — SIGNIFICANT CHANGE UP (ref 0.1–0.6)
MONOCYTES NFR BLD AUTO: 4.9 % — SIGNIFICANT CHANGE UP (ref 1.7–9.3)
NEUTROPHILS # BLD AUTO: 4.78 K/UL — SIGNIFICANT CHANGE UP (ref 1.4–6.5)
NEUTROPHILS NFR BLD AUTO: 65.4 % — SIGNIFICANT CHANGE UP (ref 42.2–75.2)
NRBC # BLD: 0 /100 WBCS — SIGNIFICANT CHANGE UP (ref 0–0)
NRBC # BLD: 0 /100 WBCS — SIGNIFICANT CHANGE UP (ref 0–0)
PHOSPHATE SERPL-MCNC: 2.2 MG/DL — SIGNIFICANT CHANGE UP (ref 2.1–4.9)
PLATELET # BLD AUTO: 147 K/UL — SIGNIFICANT CHANGE UP (ref 130–400)
PLATELET # BLD AUTO: 162 K/UL — SIGNIFICANT CHANGE UP (ref 130–400)
PMV BLD: 11.7 FL — HIGH (ref 7.4–10.4)
PMV BLD: 12.1 FL — HIGH (ref 7.4–10.4)
POTASSIUM SERPL-MCNC: 3.3 MMOL/L — LOW (ref 3.5–5)
POTASSIUM SERPL-MCNC: 3.8 MMOL/L — SIGNIFICANT CHANGE UP (ref 3.5–5)
POTASSIUM SERPL-SCNC: 3.3 MMOL/L — LOW (ref 3.5–5)
POTASSIUM SERPL-SCNC: 3.8 MMOL/L — SIGNIFICANT CHANGE UP (ref 3.5–5)
PROT SERPL-MCNC: 5.4 G/DL — LOW (ref 6–8)
PROT SERPL-MCNC: 5.5 G/DL — LOW (ref 6–8)
RBC # BLD: 3.54 M/UL — LOW (ref 4.7–6.1)
RBC # BLD: 3.62 M/UL — LOW (ref 4.7–6.1)
RBC # FLD: 13.6 % — SIGNIFICANT CHANGE UP (ref 11.5–14.5)
RBC # FLD: 13.7 % — SIGNIFICANT CHANGE UP (ref 11.5–14.5)
SODIUM SERPL-SCNC: 154 MMOL/L — HIGH (ref 135–146)
SODIUM SERPL-SCNC: 155 MMOL/L — HIGH (ref 135–146)
TSH SERPL-MCNC: 5.63 UIU/ML — HIGH (ref 0.27–4.2)
VIT B12 SERPL-MCNC: 1383 PG/ML — HIGH (ref 232–1245)
WBC # BLD: 7.12 K/UL — SIGNIFICANT CHANGE UP (ref 4.8–10.8)
WBC # BLD: 7.31 K/UL — SIGNIFICANT CHANGE UP (ref 4.8–10.8)
WBC # FLD AUTO: 7.12 K/UL — SIGNIFICANT CHANGE UP (ref 4.8–10.8)
WBC # FLD AUTO: 7.31 K/UL — SIGNIFICANT CHANGE UP (ref 4.8–10.8)

## 2024-12-24 PROCEDURE — 99223 1ST HOSP IP/OBS HIGH 75: CPT

## 2024-12-24 PROCEDURE — 99497 ADVNCD CARE PLAN 30 MIN: CPT | Mod: 25

## 2024-12-24 PROCEDURE — 99233 SBSQ HOSP IP/OBS HIGH 50: CPT

## 2024-12-24 RX ORDER — PIPERACILLIN AND TAZOBACTAM 3; .375 G/15ML; G/15ML
3.38 INJECTION, POWDER, LYOPHILIZED, FOR SOLUTION INTRAVENOUS EVERY 8 HOURS
Refills: 0 | Status: DISCONTINUED | OUTPATIENT
Start: 2024-12-24 | End: 2024-12-27

## 2024-12-24 RX ORDER — POTASSIUM CHLORIDE 600 MG/1
20 TABLET, FILM COATED, EXTENDED RELEASE ORAL ONCE
Refills: 0 | Status: COMPLETED | OUTPATIENT
Start: 2024-12-24 | End: 2024-12-24

## 2024-12-24 RX ADMIN — CHLORHEXIDINE GLUCONATE 1 APPLICATION(S): 1.2 RINSE ORAL at 11:47

## 2024-12-24 RX ADMIN — PIPERACILLIN AND TAZOBACTAM 200 GRAM(S): 3; .375 INJECTION, POWDER, LYOPHILIZED, FOR SOLUTION INTRAVENOUS at 13:32

## 2024-12-24 RX ADMIN — PIPERACILLIN AND TAZOBACTAM 25 GRAM(S): 3; .375 INJECTION, POWDER, LYOPHILIZED, FOR SOLUTION INTRAVENOUS at 22:20

## 2024-12-24 RX ADMIN — PIPERACILLIN AND TAZOBACTAM 200 GRAM(S): 3; .375 INJECTION, POWDER, LYOPHILIZED, FOR SOLUTION INTRAVENOUS at 06:03

## 2024-12-24 RX ADMIN — HEPARIN SODIUM 5000 UNIT(S): 1000 INJECTION, SOLUTION INTRAVENOUS; SUBCUTANEOUS at 17:51

## 2024-12-24 RX ADMIN — HEPARIN SODIUM 5000 UNIT(S): 1000 INJECTION, SOLUTION INTRAVENOUS; SUBCUTANEOUS at 06:03

## 2024-12-24 RX ADMIN — POTASSIUM CHLORIDE 50 MILLIEQUIVALENT(S): 600 TABLET, FILM COATED, EXTENDED RELEASE ORAL at 17:51

## 2024-12-24 RX ADMIN — SODIUM CHLORIDE 100 MILLILITER(S): 9 INJECTION, SOLUTION INTRAVENOUS at 11:00

## 2024-12-24 NOTE — PROGRESS NOTE ADULT - ATTENDING COMMENTS
Noted Edited above    Hypernatremia improving on fluids  Bradycardia stable  Hypothyroid- tsh elevated. check t4 if low would start synthroid low dose  Monitor PO intake

## 2024-12-24 NOTE — ADVANCED PRACTICE NURSE CONSULT - ASSESSMENT
History of Present Illness:   84-year-old male with past medical history of HTN, BPH, CAD, vitamin D def, and Alzheimer's dementia presents to the ED from SNF for abnormal lab result of Na 177. The patient is awake and active but non-verbal.      Patient received lying in bed. Awake. Limited mobility. Incontinent of urine and stool. High risk for pressure injury development and progression.    Dry intact hyperpigmentation to sacrococcygeal region.     Skin to bilateral heels intact at time of assessment.     Skin assessed with primary RN bedside.

## 2024-12-24 NOTE — DIETITIAN INITIAL EVALUATION ADULT - ORAL INTAKE PTA/DIET HISTORY
Unable to obtain history as patient has Dementia & non-verbal. RD contacted family (daugher-in-law) [phone], but the person was not able to provide hx. RD to follow-upat follow-up assessment.

## 2024-12-24 NOTE — DIETITIAN INITIAL EVALUATION ADULT - ADD RECOMMEND
Interventions: Meals, coordination of care  Monitoring/Evaluation: Weights, labs, PO intake, nutrition-focused physical findings  Recommendations:   1. Continue current diet  2. Encourage PO intake, hydration, and assist PRN High Risk  Interventions: Meals, coordination of care, nutritional supplements  Monitoring/Evaluation: Weights, labs, PO intake, nutrition-focused physical findings  Recommendations:   1. Continue current diet  2. Encourage PO intake, hydration, and assist PRN  3. Add nutritional supplement - r/t poor PO intake

## 2024-12-24 NOTE — PROGRESS NOTE ADULT - ASSESSMENT
83 yo M with hx of HTN, CAD, BPH and Alzheimer dementia  presents from NH for hypernatremia.     #Hypernatremia 2/2 dehydration  - Na 175 -> 170 -> 162 -> 159  - Shant 50, UOsm 812  - Nephro consulted: decrease by ~10 per 24hrs  - On IV D5NS0.45 120cc/h   - Monitor BMP BID  - Free water deficit 4.1L  - Given that patient is not tolerating PO feeds, would benefit from NG tube for Free water    #RLL PNA possibly 2/2 aspiration  - CT shows Right lower lobe multifocal consolidative and nodular opacities. Additional smaller nodular opacities in all 5 lobes. Findings most likely infectious in nature. Correlate with symptoms. Given the indicated concern for malignancy, follow up in one month is recommended for resolution and re-evaluation  - Cont zosyn  - BCx neg, RVP neg. MRSA pending.  - Aspiration precaution.   - S&S cs: pureed diet - however patient is not eating    #ANGEL 2/2 prerenal 2/2 poor PO intake  - Crea 2.4 (baseline 1.2) --> 1.8  - C/w IVF    #Hydrocele  - Scrotal swelling --> urology consulted to r/o inguinal hernia vs hydrocele  - Scrotal US done: Massive right hydrocele containing debris, total volume of 188 cc. Differential includes hemorrhage versus infection.  - Urology informed --> no acute intervention. FU OP    #Macrocytic anemia  - Hb 11.9, .6  - FU B12, folate    #Bradycardia  - HR dropped 39-40s when asleep  - BP stable  - Monitor and EKG showed sinus bradycardia  - Improvement when awake  - Transfer to telemetry     #HTN   - hold anti-HTN medication for now.     #Alzheimer dementia   - Hold donepezil for now.  - C/w seroquel 12.5mg BID    #BPH   - c/w finasteride 5mg od    #MISC  DVT ppx: heparin  GI ppx: not indicated  AAT  Diet: pureed    Pending  Hypernatremia monitoring   85 yo M with hx of HTN, CAD, BPH and Alzheimer dementia  presents from NH for hypernatremia.     #Hypernatremia 2/2 dehydration  #Hypokalemia   #ANGEL 2/2 prerenal 2/2 poor PO intake  - Na 175 -> 170 -> 162 -> 159-> 155  - Crea 2.4 (baseline 1.2) --> 1.8  - Shant 50, UOsm 812  - Nephro consulted: target by ~10 per 24hrs  - On IV D5NS0.45 120cc/h  - continue NA improving   - Monitor BMP BID  - Free water deficit 4.1L  - Given that patient is not tolerating PO feeds, would benefit from NG tube for Free water  - Palliative care for Peg discussion    #RLL PNA possibly 2/2 aspiration  - CT shows Right lower lobe multifocal consolidative and nodular opacities. Additional smaller nodular opacities in all 5 lobes. Findings most likely infectious in nature. Correlate with symptoms. Given the indicated concern for malignancy, follow up in one month is recommended for resolution and re-evaluation  - Cont zosyn  - BCx neg, RVP neg. MRSA pending.  - Aspiration precaution.   - S&S cs: pureed diet - however patient is not eating    #Hydrocele  - Scrotal swelling --> urology consulted to r/o inguinal hernia vs hydrocele  - Scrotal US done: Massive right hydrocele containing debris, total volume of 188 cc. Differential includes hemorrhage versus infection.  - Urology informed --> no acute intervention. FU OP    #Macrocytic anemia  - Hb 11.9, .6  -  B12, folate unremarkable     #Bradycardia  - HR dropped 39-40s when asleep  - BP stable  - Monitor and EKG showed sinus bradycardia  - Improvement when awake  - TSH elevated. Get T4-> start synthroid if low   - still bradycardic    #HTN   - hold anti-HTN medication for now.     #Alzheimer dementia   - Hold donepezil for now.  - C/w seroquel 12.5mg BID    #BPH   - c/w finasteride 5mg od    #MISC  DVT ppx: heparin  GI ppx: not indicated  AAT  Diet: pureed    Pending  Hypernatremia monitoring

## 2024-12-24 NOTE — CONSULT NOTE ADULT - CONVERSATION DETAILS
[facility]  #[id number]    Discussed with Israel, patient's wife is his primary decision maker, and she will discuss further with her. In terms of his baseline at [facility], Israel states patient can normally feed himself, but cannot really converse. Discussed nutritional goals of care, namely PEG placement. Discussed utility of PEG in dementia, and that it would provide caloric intake but not improve his current overall clinical status. Israel expressed understanding. Also introduced code status, at which point Israel provided number for patient's wife and deferred further discussion with her.     Discussed with patient's wife, and discussed nutritional GOC. She deferred decision to the medical team, but was informed the decision is her family's decision. She states she would not want a PEG tube, as he is "really elderly and keeping him alive doesn't do much for him and he's suffering." Discussed code status as well, and described process of CPR and chances of meaningful success. She states she would want him to "die naturally" and agreed to DNR/DNI/no PEG.     > 25 minutes spent

## 2024-12-24 NOTE — DIETITIAN INITIAL EVALUATION ADULT - NSFNSPHYEXAMSKINFT_GEN_A_CORE
Pressure Injury 1: sacrum, Suspected deep tissue injury    Per wound nurse eval:   Patient received lying in bed. Awake. Limited mobility. Incontinent of urine and stool. High risk for pressure injury development and progression.    Dry intact hyperpigmentation to sacrococcygeal region.     Skin to bilateral heels intact at time of assessment.     -no pressure injury noted in wound evaluation

## 2024-12-24 NOTE — CONSULT NOTE ADULT - ASSESSMENT
84M with PMH of HTN, BPH, CAD, VDD, Alzheimer's dementia here from SNF with hypernatremia and poor PO intake. Palliative consulted for GOC.    - please use [facility]  to speak with family, surrogate decision-maker is wife (number above)  - see GOC above, DNR/DNI/no PEG  - hospice referral placed via case management  - goals clear as above, will sign off, reconsult PRN    ______________  Ki Justin MD  Palliative Medicine  [facility]  [phone]

## 2024-12-24 NOTE — DIETITIAN INITIAL EVALUATION ADULT - OTHER CALCULATIONS
Energy needs calculated with consideration for acuity of illness, weight status, skin integrity, & age   Increased fluid related to dehydration (elevated electrolytes)

## 2024-12-24 NOTE — DIETITIAN INITIAL EVALUATION ADULT - PERTINENT MEDS FT
MEDICATIONS  (STANDING):  aspirin  chewable 81 milliGRAM(s) Oral daily  chlorhexidine 2% Cloths 1 Application(s) Topical daily  dextrose 5% + sodium chloride 0.45%. 1000 milliLiter(s) (100 mL/Hr) IV Continuous <Continuous>  finasteride 5 milliGRAM(s) Oral daily  heparin   Injectable 5000 Unit(s) SubCutaneous every 12 hours  piperacillin/tazobactam IVPB.. 3.375 Gram(s) IV Intermittent every 8 hours  QUEtiapine 12.5 milliGRAM(s) Oral two times a day    MEDICATIONS  (PRN):

## 2024-12-24 NOTE — DIETITIAN INITIAL EVALUATION ADULT - REASON
Patient with no overt s/s of malnutrition RD deferred NFPE due to patient's uncooperative disposition. Will try to perform NFPE at follow-up

## 2024-12-24 NOTE — ADVANCED PRACTICE NURSE CONSULT - RECOMMEDATIONS
Cleanse sacrococcygeal region with soap and water.   Pat dry apply moisture barrier cream twice a day and prn for soiling.     Maintain pressure injury prevention.   Keep skin clean.   Maintain incontinence care.   Monitor skin for changes and notify provider   Case discussed with primary RN bedside

## 2024-12-24 NOTE — PROGRESS NOTE ADULT - TIME BILLING
Patient seen at bedside, time spent evaluating and treating the patient's acute illness as well as time spent reviewing labs, radiology, discussing with patient and/or patient's family and discussing the case with a multidisciplinary team.
time spent on review of labs, imaging studies, old records, obtaining history, personally examining patient, counselling and communicating with patient/ family, entering orders for medications/tests/etc, discussions with other health care providers, documentation in electronic health records, independent interpretation of labs, imaging/procedure results and care coordination.

## 2024-12-24 NOTE — DIETITIAN INITIAL EVALUATION ADULT - OTHER INFO
A 84-year-old male with past medical history of HTN, BPH, CAD, vitamin D def, and Alzheimer's dementia presents to the ED from SNF. Patient admitted with hyperosmolality with hypernatremia.

## 2024-12-24 NOTE — PROGRESS NOTE ADULT - SUBJECTIVE AND OBJECTIVE BOX
SUBJECTIVE/OVERNIGHT EVENTS  Today is hospital day 4d. This morning patient was seen and examined at bedside, resting comfortably in bed. No acute or major events overnight.    MEDICATIONS  STANDING MEDICATIONS  aspirin  chewable 81 milliGRAM(s) Oral daily  chlorhexidine 2% Cloths 1 Application(s) Topical daily  dextrose 5% + sodium chloride 0.45%. 1000 milliLiter(s) IV Continuous <Continuous>  finasteride 5 milliGRAM(s) Oral daily  heparin   Injectable 5000 Unit(s) SubCutaneous every 12 hours  piperacillin/tazobactam IVPB.. 2.25 Gram(s) IV Intermittent every 8 hours  QUEtiapine 12.5 milliGRAM(s) Oral two times a day    PRN MEDICATIONS    VITALS  T(F): 97.1 (12-23-24 @ 22:43), Max: 97.1 (12-23-24 @ 20:11)  HR: 45 (12-24-24 @ 05:39) (39 - 66)  BP: 110/53 (12-24-24 @ 05:39) (110/53 - 162/67)  RR: 18 (12-24-24 @ 05:39) (18 - 19)  SpO2: 99% (12-24-24 @ 05:39) (99% - 100%)    PHYSICAL EXAM  GENERAL  ( X ) NAD, lying in bed comfortably     (  ) obtunded     (  ) lethargic     (  ) somnolent    HEAD  (  ) Atraumatic     (  ) hematoma     (  ) laceration (specify location:       )     NECK  (  ) Supple     (  ) neck stiffness     (  ) nuchal rigidity     (  )  no JVD     (  ) JVD present ( -- cm)    HEART  Rate -->  ( X ) normal rate    (  ) bradycardic    (  ) tachycardic  Rhythm -->  (  ) regular    (  ) regularly irregular    (  ) irregularly irregular  Murmurs -->  (  ) normal s1/s2    (  ) systolic murmur    (  ) diastolic murmur    (  ) continuous murmur     (  ) S3 present    (  ) S4 present    LUNGS  ( X )Unlabored respirations     (  ) tachypnea  (  ) B/L air entry     (  ) decreased breath sounds in:  (location     )    (  ) no adventitious sound     (  ) crackles     (  ) wheezing      (  ) rhonchi      (specify location:       )  (  ) chest wall tenderness (specify location:       )    ABDOMEN  ( X ) Soft     (  ) tense   |   (  ) nondistended     (  ) distended   |   (  ) +BS     (  ) hypoactive bowel sounds     (  ) hyperactive bowel sounds  (  ) nontender     (  ) RUQ tenderness     (  ) RLQ tenderness     (  ) LLQ tenderness     (  ) epigastric tenderness     (  ) diffuse tenderness  (  ) Splenomegaly      (  ) Hepatomegaly      (  ) Jaundice     (  ) ecchymosis     EXTREMITIES  (  ) Normal     (  ) Rash     (  ) ecchymosis     (  ) varicose veins      (  ) pitting edema     (  ) non-pitting edema   (  ) ulceration     (  ) gangrene:     (location:     )    NERVOUS SYSTEM  ( X ) A&Ox0     (  ) confused     (  ) lethargic  CN II-XII:     (  ) Intact     (  ) focal deficits  (Specify:     )   Upper extremities:     (  ) strength X/5     (  ) focal deficit (specify:    )  Lower extremities:     (  ) strength  X/5    (  ) focal deficit (specify:    )    SKIN  (  ) No rashes or lesions     (  ) maculopapular rash     (  ) pustules     (  ) vesicles     (  ) ulcer     (  ) ecchymosis     (specify location:     )    (  ) Indwelling Johnson Catheter   Date insterted:    Reason (  ) Critical illness     (  ) urinary retention    (  ) Accurate Ins/Outs Monitoring     (  ) CMO patient    (  ) Central Line  Date inserted:  Location: (  ) Right IJ   (  ) Left IJ   (  ) Right Fem   (  ) Left Fem    (  ) SPC  (  ) pigtail  (  ) PEG tube  (  ) colostomy  (  ) jejunostomy  (  ) U-Dall    LABS             11.9   10.78 )-----------( 158      ( 12-23-24 @ 05:32 )             40.2     159  |  129  |  29  -------------------------<  98   12-23-24 @ 16:00  3.8  |  20  |  1.6    Ca      7.2     12-23-24 @ 16:00  Phos   2.2     12-23-24 @ 05:32  Mg     2.7     12-23-24 @ 05:32    TPro  6.1  /  Alb  2.3  /  TBili  0.4  /  DBili  x   /  AST  57  /  ALT  42  /  AlkPhos  83  /  GGT  x     12-23-24 @ 05:32        Urinalysis Basic - ( 23 Dec 2024 16:00 )    Color: x / Appearance: x / SG: x / pH: x  Gluc: 98 mg/dL / Ketone: x  / Bili: x / Urobili: x   Blood: x / Protein: x / Nitrite: x   Leuk Esterase: x / RBC: x / WBC x   Sq Epi: x / Non Sq Epi: x / Bacteria: x          IMAGING SUBJECTIVE/OVERNIGHT EVENTS  Today is hospital day 4d.     Patient is still not eating. unable to get ROS    MEDICATIONS  STANDING MEDICATIONS  aspirin  chewable 81 milliGRAM(s) Oral daily  chlorhexidine 2% Cloths 1 Application(s) Topical daily  dextrose 5% + sodium chloride 0.45%. 1000 milliLiter(s) IV Continuous <Continuous>  finasteride 5 milliGRAM(s) Oral daily  heparin   Injectable 5000 Unit(s) SubCutaneous every 12 hours  piperacillin/tazobactam IVPB.. 2.25 Gram(s) IV Intermittent every 8 hours  QUEtiapine 12.5 milliGRAM(s) Oral two times a day    PRN MEDICATIONS    VITALS  T(F): 97.1 (12-23-24 @ 22:43), Max: 97.1 (12-23-24 @ 20:11)  HR: 45 (12-24-24 @ 05:39) (39 - 66)  BP: 110/53 (12-24-24 @ 05:39) (110/53 - 162/67)  RR: 18 (12-24-24 @ 05:39) (18 - 19)  SpO2: 99% (12-24-24 @ 05:39) (99% - 100%)    PHYSICAL EXAM  GENERAL  ( X ) NAD, lying in bed comfortably     (  ) obtunded     (  ) lethargic     (  ) somnolent    HEART  Rate -->  ( X ) normal rate    (  ) bradycardic    (  ) tachycardic  Rhythm -->  (  ) regular    (  ) regularly irregular    (  ) irregularly irregular  Murmurs -->  (  ) normal s1/s2    (  ) systolic murmur    (  ) diastolic murmur    (  ) continuous murmur     (  ) S3 present    (  ) S4 present    LUNGS  ( X )Unlabored respirations     (  ) tachypnea  (  ) B/L air entry     (  ) decreased breath sounds in:  (location     )    (  ) no adventitious sound     (  ) crackles     (  ) wheezing      (  ) rhonchi      (specify location:       )  (  ) chest wall tenderness (specify location:       )    ABDOMEN  ( X ) Soft     (  ) tense   |   (  ) nondistended     (  ) distended   |   (  ) +BS     (  ) hypoactive bowel sounds     (  ) hyperactive bowel sounds  (  ) nontender     (  ) RUQ tenderness     (  ) RLQ tenderness     (  ) LLQ tenderness     (  ) epigastric tenderness     (  ) diffuse tenderness  (  ) Splenomegaly      (  ) Hepatomegaly      (  ) Jaundice     (  ) ecchymosis     EXTREMITIES  (  ) Normal     (  ) Rash     (  ) ecchymosis     (  ) varicose veins      (  ) pitting edema     (  ) non-pitting edema   (  ) ulceration     (  ) gangrene:     (location:     )    NERVOUS SYSTEM  ( X ) A&Ox0     (  ) confused     (  ) lethargic  CN II-XII:     (  ) Intact     (  ) focal deficits  (Specify:     )   Upper extremities:     (  ) strength X/5     (  ) focal deficit (specify:    )  Lower extremities:     (  ) strength  X/5    (  ) focal deficit (specify:    )    SKIN  (  ) No rashes or lesions     (  ) maculopapular rash     (  ) pustules     (  ) vesicles     (  ) ulcer     (  ) ecchymosis     (specify location:     )    (  ) Indwelling Johnson Catheter   Date insterted:    Reason (  ) Critical illness     (  ) urinary retention    (  ) Accurate Ins/Outs Monitoring     (  ) CMO patient    (  ) Central Line  Date inserted:  Location: (  ) Right IJ   (  ) Left IJ   (  ) Right Fem   (  ) Left Fem    (  ) SPC  (  ) pigtail  (  ) PEG tube  (  ) colostomy  (  ) jejunostomy  (  ) U-Dall    LABS             11.9   10.78 )-----------( 158      ( 12-23-24 @ 05:32 )             40.2     159  |  129  |  29  -------------------------<  98   12-23-24 @ 16:00  3.8  |  20  |  1.6    Ca      7.2     12-23-24 @ 16:00  Phos   2.2     12-23-24 @ 05:32  Mg     2.7     12-23-24 @ 05:32    TPro  6.1  /  Alb  2.3  /  TBili  0.4  /  DBili  x   /  AST  57  /  ALT  42  /  AlkPhos  83  /  GGT  x     12-23-24 @ 05:32        Urinalysis Basic - ( 23 Dec 2024 16:00 )    Color: x / Appearance: x / SG: x / pH: x  Gluc: 98 mg/dL / Ketone: x  / Bili: x / Urobili: x   Blood: x / Protein: x / Nitrite: x   Leuk Esterase: x / RBC: x / WBC x   Sq Epi: x / Non Sq Epi: x / Bacteria: x          IMAGING    < from: CT Chest No Cont (12.20.24 @ 21:34) >  IMPRESSION:    Right lower lobe multifocal consolidative and nodular opacities.   Additional smaller nodular opacities in all 5 lobes. Findings most likely   infectious in nature. Correlate with symptoms. Given the indicated   concern for malignancy, follow up in one month is recommended for   resolution and re-evaluation.    --- End of Report ---    < end of copied text >

## 2024-12-24 NOTE — PROGRESS NOTE ADULT - SUBJECTIVE AND OBJECTIVE BOX
Nephrology progress note     lethargic and not conversive  ON events/Subjective:     Allergies:  No Known Allergies    Hospital Medications:   MEDICATIONS  (STANDING):  aspirin  chewable 81 milliGRAM(s) Oral daily  chlorhexidine 2% Cloths 1 Application(s) Topical daily  dextrose 5% + sodium chloride 0.45%. 1000 milliLiter(s) (100 mL/Hr) IV Continuous <Continuous>  finasteride 5 milliGRAM(s) Oral daily  heparin   Injectable 5000 Unit(s) SubCutaneous every 12 hours  piperacillin/tazobactam IVPB.. 2.25 Gram(s) IV Intermittent every 8 hours  QUEtiapine 12.5 milliGRAM(s) Oral two times a day    REVIEW OF SYSTEMS:  unable to obtain  All other review of systems is negative unless indicated above.    VITALS:  T(F): 97.3 (12-24-24 @ 11:47), Max: 97.3 (12-24-24 @ 11:47)  HR: 74 (12-24-24 @ 11:47)  BP: 117/63 (12-24-24 @ 11:47)  RR: 18 (12-24-24 @ 11:47)  SpO2: 99% (12-24-24 @ 05:39)  Wt(kg): --    12-22 @ 07:01  -  12-23 @ 07:00  --------------------------------------------------------  IN: 1360 mL / OUT: 0 mL / NET: 1360 mL    12-23 @ 07:01  -  12-24 @ 07:00  --------------------------------------------------------  IN: 700 mL / OUT: 0 mL / NET: 700 mL        PHYSICAL EXAM:  Constitutional: NAD  HEENT: anicteric sclera, oropharynx clear, MMM  Neck: No JVD  Respiratory: CTAB, no wheezes, rales or rhonchi  Cardiovascular: S1, S2, RRR  Gastrointestinal: BS+, soft, NT/ND  Extremities: No cyanosis or clubbing. No peripheral edema  Neurological: A/O x 3, no focal deficits  Psychiatric: Normal mood, normal affect  : No CVA tenderness. No nieto.   Skin: No rashes  Vascular Access:    LABS:  12-23    159[H]  |  129[H]  |  29[H]  ----------------------------<  98  3.8   |  20  |  1.6[H]    Ca    7.2[L]      23 Dec 2024 16:00  Phos  2.2     12-23  Mg     2.7     12-23    TPro  6.1  /  Alb  2.3[L]  /  TBili  0.4  /  DBili      /  AST  57[H]  /  ALT  42[H]  /  AlkPhos  83  12-23                          11.9   10.78 )-----------( 158      ( 23 Dec 2024 05:32 )             40.2       Urine Studies:  Creatinine Trend: 1.6<--, 1.8<--, 1.7<--, 2.0<--, 1.8<--, 2.1<--  Urinalysis Basic - ( 23 Dec 2024 16:00 )    Color:  / Appearance:  / SG:  / pH:   Gluc: 98 mg/dL / Ketone:   / Bili:  / Urobili:    Blood:  / Protein:  / Nitrite:    Leuk Esterase:  / RBC:  / WBC    Sq Epi:  / Non Sq Epi:  / Bacteria:       Sodium, Random Urine: 50.0 mmoL/L (12-20 @ 19:08)  Osmolality, Random Urine: 812 mos/kg (12-20 @ 19:08)  ·seenn in surgical intensive care unit  ·admitted from NH with pneumonia and severe hypernatremia and ANGEL  ·hypernatremia likley from volume depletion from decreased po intake - is improving on ivf  ·ANGEL improved on ivf  ·hemodynamics stable  ·pneumonia (? aspiration ) on zosyn    1) ok with D51/2 NS at 100 cc/hr - suggest target sodium correction 10 mmol/L per day  2) abx per Medicine  3) am labs  4) feed as tolerates  5) consider stopping seroquel

## 2024-12-24 NOTE — DIETITIAN INITIAL EVALUATION ADULT - ENERGY INTAKE
PO intake percentage not noted in EMR.  PO intake percentage not noted in EMR. Per nurse patient refuses to open his mouth for anything. Per nurse family visited patient today, but was not present during RD's assessment. Patient refused to eat food his family brought in today. Patient has also been combative with staff rendering care. Patient sitting in bed with an agitated disposition not making eye contact.

## 2024-12-24 NOTE — DIETITIAN INITIAL EVALUATION ADULT - PERTINENT LABORATORY DATA
12-24    155[H]  |  123[H]  |  22[H]  ----------------------------<  115[H]  3.3[L]   |  26  |  1.7[H]    Ca    7.4[L]      24 Dec 2024 13:34  Phos  2.2     12-23  Mg     2.3     12-24    TPro  5.5[L]  /  Alb  2.2[L]  /  TBili  0.4  /  DBili  x   /  AST  61[H]  /  ALT  40  /  AlkPhos  78  12-24

## 2024-12-24 NOTE — DIETITIAN INITIAL EVALUATION ADULT - PHYSCIAL ASSESSMENT
well nourished patient with visible signs of age-related muscle atrophy and fat loss based on visual assessment of head and neck.

## 2024-12-24 NOTE — CONSULT NOTE ADULT - SUBJECTIVE AND OBJECTIVE BOX
HPI: 84M with PMH of HTN, BPH, CAD, VDD, Alzheimer's dementia here from SNF with hypernatremia and poor PO intake. Palliative consulted for GOC.    PERTINENT PM/SXH:   CAD (coronary artery disease)  Central pain syndrome  BPH (benign prostatic hyperplasia)  Benign essential HTN  Vitamin D deficiency  Alzheimer disease    FAMILY HISTORY:  no pertinent family history      SOCIAL HISTORY:   Significant other/partner[X ]  Children[ ]  Baptism/Spirituality:  Substance hx:  [ ]   Tobacco hx:  [ ]   Alcohol hx: [ ]   Living Situation: [ ]Home  [X ]Long term care  [ ]Rehab [ ]Other  Home Services: [ ] HHA [ ] Visting RN [ ] Hospice  Occupation:  Home Opioid hx:  [ ] Y [ ] N [ ] I-Stop Reference No:    ADVANCE DIRECTIVES:    [X ] Full Code [ ] DNR  MOLST  [ ]  Living Will  [ ]   DECISION MAKER(s):  [ ] Health Care Proxy(s)  [X] Surrogate(s)  [ ] Guardian           Name(s): Phone Number(s):   wife: [phone]  daughter-in-law ([place]): [phone]    BASELINE (I)ADL(s) (prior to admission):  [place]: [ ]Total  [ ] Moderate [ ]Dependent  Palliative Performance Status Version 2:         %    http://npcrc.org/files/news/palliative_performance_scale_ppsv2.pdf    Allergies    No Known Allergies    Intolerances    MEDICATIONS  (STANDING):  aspirin  chewable 81 milliGRAM(s) Oral daily  chlorhexidine 2% Cloths 1 Application(s) Topical daily  dextrose 5% + sodium chloride 0.45%. 1000 milliLiter(s) (100 mL/Hr) IV Continuous <Continuous>  finasteride 5 milliGRAM(s) Oral daily  heparin   Injectable 5000 Unit(s) SubCutaneous every 12 hours  piperacillin/tazobactam IVPB.. 3.375 Gram(s) IV Intermittent every 8 hours  QUEtiapine 12.5 milliGRAM(s) Oral two times a day    MEDICATIONS  (PRN):      PRESENT SYMPTOMS: [X ]Unable to obtain due to poor mentation   Source if other than patient:  [ ]Family   [ ]Team   [ ]All review of systems negative including pain and dyspnea unless noted below    All components of pain assessment below addressed. Blank spaces indicate that the patient did/could not complete the assessment.  Pain: [ ]yes [ ]no  QOL impact -   Location -                    Aggravating factors -  Quality -  Radiation -  Timing-  Severity (0-10 scale):  Minimal acceptable level (0-10 scale):     CPOT:    https://www.[facility].org/getattachment/coa29e48-7d4p-5o8e-7a5z-8681z7770m8g/Critical-Care-Pain-Observation-Tool-(CPOT)    PAIN AD Score: 0  http://geriatrictoolkit.[facility]/cog/painad.pdf (press ctrl +  left click to view)    Dyspnea:                           [ ]None[ ]Mild [ ]Moderate [ ]Severe     Respiratory Distress Observation Scale (RDOS): 0  A score of 0 to 2 signifies little or no respiratory distress, 3 signifies mild distress, scores 4 to 6 indicate moderate distress, and scores greater than 7 signify severe distress  https://www.[facility].ca/sites/default/files/PDFS/167247-nughrghcdml-kjfeesbu-wcymelabezh-jsgvl.pdf    Anxiety:                             [ ]None[ ]Mild [ ]Moderate [ ]Severe   Fatigue:                             [ ]None[ ]Mild [ ]Moderate [ ]Severe   Nausea:                             [ ]None[ ]Mild [ ]Moderate [ ]Severe   Loss of appetite:              [ ]None[ ]Mild [ ]Moderate [ ]Severe   Constipation:                    [ ]None[ ]Mild [ ]Moderate [ ]Severe    Other Symptoms:      Palliative Performance Status Version 2:         %    http://npcrc.org/files/news/palliative_performance_scale_ppsv2.pdf  PHYSICAL EXAM:  Vital Signs Last 24 Hrs  T(C): 36.3 (24 Dec 2024 11:47), Max: 36.3 (24 Dec 2024 11:47)  T(F): 97.3 (24 Dec 2024 11:47), Max: 97.3 (24 Dec 2024 11:47)  HR: 74 (24 Dec 2024 11:47) (39 - 74)  BP: 117/63 (24 Dec 2024 11:47) (110/53 - 118/62)  BP(mean): 85 (23 Dec 2024 20:11) (77 - 85)  RR: 18 (24 Dec 2024 11:47) (18 - 19)  SpO2: 99% (24 Dec 2024 05:39) (99% - 100%)     I&O's Summary    23 Dec 2024 07:01  -  24 Dec 2024 07:00  --------------------------------------------------------  IN: 700 mL / OUT: 0 mL / NET: 700 mL    24 Dec 2024 07:01  -  24 Dec 2024 15:22  --------------------------------------------------------  IN: 0 mL / OUT: 250 mL / NET: -250 mL        GENERAL:  [X ] No acute distress [ ]Lethargic  [ ]Unarousable  [ ]Verbal  [ ]Non-Verbal [ ]Cachexia    BEHAVIORAL/PSYCH:  [ ]Alert and Oriented x  [ ] Anxiety [ ] Delirium [ ] Agitation [X ] Calm   EYES: [ ] No scleral icterus [ ] Scleral icterus [X ] Closed  ENMT:  [ ]Dry mouth  [ ]No external oral lesions [ X] No external ear or nose lesions  CARDIOVASCULAR:  [ ]Regular [ ]Irregular [ ]Tachy [ ]Not Tachy  [ ]Yonas [ ] Edema [ ] No edema  PULMONARY:  [ ]Tachypnea  [ ]Audible excessive secretions [X ] No labored breathing [ ] labored breathing  GASTROINTESTINAL: [ ]Soft  [ ]Distended  [ X]Not distended [ ]Non tender [ ]Tender  MUSCULOSKELETAL: [ ]No clubbing [ ] clubbing  [ X] No cyanosis [ ] cyanosis  NEUROLOGIC: [ ]No focal deficits  [ ]Follows commands  [ ]Does not follow commands  [ X]Cognitive impairment  [ ]Dysphagia  [ ]Dysarthria  [ ]Paresis   SKIN: [ ] Jaundiced [X ] Non-jaundiced [ ]Rash [ ]No Rash [ ] Warm [ ] Dry  MISC/LINES: [ ] ET tube [ ] Trach [ ]NGT/OGT [ ]PEG [ ]Johnson    CRITICAL CARE:  [ ] Shock Present  [ ]Septic [ ]Cardiogenic [ ]Neurologic [ ]Hypovolemic  [ ]  Vasopressors [ ]  Inotropes   [ ]Respiratory failure present [ ]Mechanical ventilation [ ]Non-invasive ventilatory support [ ]High flow  [ ]Acute  [ ]Chronic [ ]Hypoxic  [ ]Hypercarbic [ ]Other  [ ]Other organ failure     LABS: reviewed by me, notable for: elevated SCr, Na                        10.9   7.12  )-----------( 147      ( 24 Dec 2024 13:34 )             35.8   12-24    155[H]  |  123[H]  |  22[H]  ----------------------------<  115[H]  3.3[L]   |  26  |  1.7[H]    Ca    7.4[L]      24 Dec 2024 13:34  Phos  2.2     12-23  Mg     2.3     12-24    TPro  5.5[L]  /  Alb  2.2[L]  /  TBili  0.4  /  DBili  x   /  AST  61[H]  /  ALT  40  /  AlkPhos  78  12-24      Urinalysis Basic - ( 24 Dec 2024 13:34 )    Color: x / Appearance: x / SG: x / pH: x  Gluc: 115 mg/dL / Ketone: x  / Bili: x / Urobili: x   Blood: x / Protein: x / Nitrite: x   Leuk Esterase: x / RBC: x / WBC x   Sq Epi: x / Non Sq Epi: x / Bacteria: x      RADIOLOGY & ADDITIONAL STUDIES: CXR personally reviewed by me: patchy bilateral opacities    PROTEIN CALORIE MALNUTRITION PRESENT: [ ]mild [ ]moderate [ ]severe [ ]underweight [ ]morbid obesity  https://www.andeal.org/vault/6123/web/files/ONC/Table_Clinical%20Characteristics%20to%20Document%20Malnutrition-White%20JV%20et%20al%202012.pdf    Height (cm): 167.5 (12-21-24 @ 22:30)  Weight (kg): 63.5 (12-21-24 @ 22:30)  BMI (kg/m2): 22.6 (12-21-24 @ 22:30)    [ ]PPSV2 < or = to 30% [ ]significant weight loss  [ ]poor nutritional intake  [ ]anasarca      [ ]Artificial Nutrition          Palliative Care Spiritual/Emotional Screening Tool Question  Severity (0-4):                    OR                    [X ] Unable to determine/NA  Score of 2 or greater indicates recommendation of Chaplaincy referral  Chaplaincy Referral: [ ] Yes [ ] Refused [ ] Following     Caregiver Hope:  [ ] Yes [ ] No    OR    [x ] Unable to determine. Will assess at later time if appropriate.  Social Work Referral [ ]  Patient and Family Centered Care Referral [ ]    Anticipatory Grief Present: [ ] Yes [ ] No    OR     [ x] Unable to determine. Will assess at later time if appropriate.  Social Work Referral [ ]  Patient and Family Centered Care Referral [ ]    REFERRALS:   [ ]Chaplaincy  [ ]Hospice  [ ]Child Life  [ ]Social Work  [ ]Case management [ ]Holistic Therapy     Palliative care education provided to patient and/or family    Goals of Care Document:     ______________  Ki Justin MD  Palliative Medicine  [facility]   of Geriatric and Palliative Medicine  [phone]

## 2024-12-25 LAB
ALBUMIN SERPL ELPH-MCNC: 2.1 G/DL — LOW (ref 3.5–5.2)
ALP SERPL-CCNC: 77 U/L — SIGNIFICANT CHANGE UP (ref 30–115)
ALT FLD-CCNC: 40 U/L — SIGNIFICANT CHANGE UP (ref 0–41)
ANION GAP SERPL CALC-SCNC: 6 MMOL/L — LOW (ref 7–14)
ANION GAP SERPL CALC-SCNC: 7 MMOL/L — SIGNIFICANT CHANGE UP (ref 7–14)
AST SERPL-CCNC: 72 U/L — HIGH (ref 0–41)
BASOPHILS # BLD AUTO: 0.02 K/UL — SIGNIFICANT CHANGE UP (ref 0–0.2)
BASOPHILS NFR BLD AUTO: 0.3 % — SIGNIFICANT CHANGE UP (ref 0–1)
BILIRUB SERPL-MCNC: 0.5 MG/DL — SIGNIFICANT CHANGE UP (ref 0.2–1.2)
BUN SERPL-MCNC: 17 MG/DL — SIGNIFICANT CHANGE UP (ref 10–20)
BUN SERPL-MCNC: 17 MG/DL — SIGNIFICANT CHANGE UP (ref 10–20)
CALCIUM SERPL-MCNC: 7.2 MG/DL — LOW (ref 8.4–10.4)
CALCIUM SERPL-MCNC: 7.4 MG/DL — LOW (ref 8.4–10.5)
CHLORIDE SERPL-SCNC: 115 MMOL/L — HIGH (ref 98–110)
CHLORIDE SERPL-SCNC: 118 MMOL/L — HIGH (ref 98–110)
CO2 SERPL-SCNC: 24 MMOL/L — SIGNIFICANT CHANGE UP (ref 17–32)
CO2 SERPL-SCNC: 25 MMOL/L — SIGNIFICANT CHANGE UP (ref 17–32)
CREAT SERPL-MCNC: 1.4 MG/DL — SIGNIFICANT CHANGE UP (ref 0.7–1.5)
CREAT SERPL-MCNC: 1.6 MG/DL — HIGH (ref 0.7–1.5)
EGFR: 42 ML/MIN/1.73M2 — LOW
EGFR: 50 ML/MIN/1.73M2 — LOW
EOSINOPHIL # BLD AUTO: 0.24 K/UL — SIGNIFICANT CHANGE UP (ref 0–0.7)
EOSINOPHIL NFR BLD AUTO: 3.6 % — SIGNIFICANT CHANGE UP (ref 0–8)
GLUCOSE SERPL-MCNC: 69 MG/DL — LOW (ref 70–99)
GLUCOSE SERPL-MCNC: 75 MG/DL — SIGNIFICANT CHANGE UP (ref 70–99)
HCT VFR BLD CALC: 37.1 % — LOW (ref 42–52)
HGB BLD-MCNC: 11.4 G/DL — LOW (ref 14–18)
IMM GRANULOCYTES NFR BLD AUTO: 0.6 % — HIGH (ref 0.1–0.3)
LYMPHOCYTES # BLD AUTO: 1.54 K/UL — SIGNIFICANT CHANGE UP (ref 1.2–3.4)
LYMPHOCYTES # BLD AUTO: 23.4 % — SIGNIFICANT CHANGE UP (ref 20.5–51.1)
MAGNESIUM SERPL-MCNC: 2 MG/DL — SIGNIFICANT CHANGE UP (ref 1.8–2.4)
MCHC RBC-ENTMCNC: 30.6 PG — SIGNIFICANT CHANGE UP (ref 27–31)
MCHC RBC-ENTMCNC: 30.7 G/DL — LOW (ref 32–37)
MCV RBC AUTO: 99.7 FL — HIGH (ref 80–94)
MONOCYTES # BLD AUTO: 0.32 K/UL — SIGNIFICANT CHANGE UP (ref 0.1–0.6)
MONOCYTES NFR BLD AUTO: 4.9 % — SIGNIFICANT CHANGE UP (ref 1.7–9.3)
NEUTROPHILS # BLD AUTO: 4.42 K/UL — SIGNIFICANT CHANGE UP (ref 1.4–6.5)
NEUTROPHILS NFR BLD AUTO: 67.2 % — SIGNIFICANT CHANGE UP (ref 42.2–75.2)
NRBC # BLD: 0 /100 WBCS — SIGNIFICANT CHANGE UP (ref 0–0)
PHOSPHATE SERPL-MCNC: 2.1 MG/DL — SIGNIFICANT CHANGE UP (ref 2.1–4.9)
PLATELET # BLD AUTO: 151 K/UL — SIGNIFICANT CHANGE UP (ref 130–400)
PMV BLD: 12 FL — HIGH (ref 7.4–10.4)
POTASSIUM SERPL-MCNC: 3.5 MMOL/L — SIGNIFICANT CHANGE UP (ref 3.5–5)
POTASSIUM SERPL-MCNC: 3.7 MMOL/L — SIGNIFICANT CHANGE UP (ref 3.5–5)
POTASSIUM SERPL-SCNC: 3.5 MMOL/L — SIGNIFICANT CHANGE UP (ref 3.5–5)
POTASSIUM SERPL-SCNC: 3.7 MMOL/L — SIGNIFICANT CHANGE UP (ref 3.5–5)
PROT SERPL-MCNC: 5.3 G/DL — LOW (ref 6–8)
RBC # BLD: 3.72 M/UL — LOW (ref 4.7–6.1)
RBC # FLD: 13.5 % — SIGNIFICANT CHANGE UP (ref 11.5–14.5)
SODIUM SERPL-SCNC: 146 MMOL/L — SIGNIFICANT CHANGE UP (ref 135–146)
SODIUM SERPL-SCNC: 149 MMOL/L — HIGH (ref 135–146)
T4 FREE SERPL-MCNC: 0.9 NG/DL — SIGNIFICANT CHANGE UP (ref 0.9–1.7)
WBC # BLD: 6.58 K/UL — SIGNIFICANT CHANGE UP (ref 4.8–10.8)
WBC # FLD AUTO: 6.58 K/UL — SIGNIFICANT CHANGE UP (ref 4.8–10.8)

## 2024-12-25 PROCEDURE — 99232 SBSQ HOSP IP/OBS MODERATE 35: CPT

## 2024-12-25 RX ORDER — SODIUM CHLORIDE 9 MG/ML
1000 INJECTION, SOLUTION INTRAVENOUS
Refills: 0 | Status: DISCONTINUED | OUTPATIENT
Start: 2024-12-25 | End: 2024-12-25

## 2024-12-25 RX ORDER — SODIUM CHLORIDE 9 MG/ML
500 INJECTION, SOLUTION INTRAVENOUS ONCE
Refills: 0 | Status: COMPLETED | OUTPATIENT
Start: 2024-12-25 | End: 2024-12-25

## 2024-12-25 RX ORDER — SODIUM CHLORIDE 9 MG/ML
1000 INJECTION, SOLUTION INTRAVENOUS
Refills: 0 | Status: DISCONTINUED | OUTPATIENT
Start: 2024-12-25 | End: 2024-12-27

## 2024-12-25 RX ADMIN — HEPARIN SODIUM 5000 UNIT(S): 1000 INJECTION, SOLUTION INTRAVENOUS; SUBCUTANEOUS at 06:21

## 2024-12-25 RX ADMIN — PIPERACILLIN AND TAZOBACTAM 25 GRAM(S): 3; .375 INJECTION, POWDER, LYOPHILIZED, FOR SOLUTION INTRAVENOUS at 22:19

## 2024-12-25 RX ADMIN — SODIUM CHLORIDE 1000 MILLILITER(S): 9 INJECTION, SOLUTION INTRAVENOUS at 03:08

## 2024-12-25 RX ADMIN — HEPARIN SODIUM 5000 UNIT(S): 1000 INJECTION, SOLUTION INTRAVENOUS; SUBCUTANEOUS at 18:02

## 2024-12-25 RX ADMIN — CHLORHEXIDINE GLUCONATE 1 APPLICATION(S): 1.2 RINSE ORAL at 12:04

## 2024-12-25 RX ADMIN — PIPERACILLIN AND TAZOBACTAM 25 GRAM(S): 3; .375 INJECTION, POWDER, LYOPHILIZED, FOR SOLUTION INTRAVENOUS at 06:21

## 2024-12-25 RX ADMIN — PIPERACILLIN AND TAZOBACTAM 25 GRAM(S): 3; .375 INJECTION, POWDER, LYOPHILIZED, FOR SOLUTION INTRAVENOUS at 13:14

## 2024-12-25 RX ADMIN — SODIUM CHLORIDE 70 MILLILITER(S): 9 INJECTION, SOLUTION INTRAVENOUS at 12:02

## 2024-12-25 RX ADMIN — SODIUM CHLORIDE 80 MILLILITER(S): 9 INJECTION, SOLUTION INTRAVENOUS at 13:14

## 2024-12-25 NOTE — PROGRESS NOTE ADULT - ASSESSMENT
85 yo M with hx of HTN, CAD, BPH and Alzheimer dementia  presents from NH for hypernatremia.     #Hypernatremia 2/2 dehydration  #Hypokalemia   #ANGEL 2/2 prerenal 2/2 poor PO intake  - Na 175 -> 170 -> 162 -> 159-> 155 -> 149  - Crea 2.4 (baseline 1.2) --> 1.8 -> 1.4 stabilized   - Shant 50, UOsm 812  - On IV D5NS0.45 120cc/h  - continue NA improving   - Monitor BMP BID  - Free water deficit 4.1L  - Palliative care for Peg discussion -> family agreed with hospice 12/24, hospice referral sent out 12/25 - discussed with case management    #RLL PNA possibly 2/2 aspiration  - CT shows Right lower lobe multifocal consolidative and nodular opacities. Additional smaller nodular opacities in all 5 lobes. Findings most likely infectious in nature. Correlate with symptoms. Given the indicated concern for malignancy, follow up in one month is recommended for resolution and re-evaluation  - Cont zosyn  - BCx neg, RVP neg. MRSA pending.  - Aspiration precaution.   - S&S cs: pureed diet - however patient is not eating    #Hydrocele  - Scrotal swelling --> urology consulted to r/o inguinal hernia vs hydrocele  - Scrotal US done: Massive right hydrocele containing debris, total volume of 188 cc. Differential includes hemorrhage versus infection.  - Urology informed --> no acute intervention. FU OP    #Macrocytic anemia  - Hb 11.9, .6  -  B12, folate unremarkable     #Bradycardia  - HR dropped 39-40s when asleep  - BP stable  - Monitor and EKG showed sinus bradycardia  - Improvement when awake, suspect degree of sleep apnea as well especially when HR dropping while sleeping  - TSH elevated to mid 5s. Get T4-> start synthroid if low   - still bradycardic    #HTN   - hold anti-HTN medication for now.     #Alzheimer dementia   - Hold donepezil for now.  - C/w seroquel 12.5mg BID    #BPH   - c/w finasteride 5mg od    #MISC  DVT ppx: heparin  GI ppx: not indicated  AAT  Diet: pureed    pending: pending hospice acceptance - home hospice preferred. continue to monitor hypernatremia - improving, was off D5 1/2, reinstated 12/25 83 yo M with hx of HTN, CAD, BPH and Alzheimer dementia  presents from NH for hypernatremia.     #Hypernatremia 2/2 dehydration  #Hypokalemia   #ANGEL 2/2 prerenal 2/2 poor PO intake  - Na 175 -> 170 -> 162 -> 159-> 155 -> 149  - Crea 2.4 (baseline 1.2) --> 1.8 -> 1.4 stabilized   - Shant 50, UOsm 812  - On IV D5NS0.45 120cc/h  - continue NA improving   - Monitor BMP BID  - Free water deficit 4.1L  - Palliative care for Peg discussion -> family agreed with hospice 12/24, hospice referral sent out 12/25 - discussed with case management    #RLL PNA possibly 2/2 aspiration  - CT shows Right lower lobe multifocal consolidative and nodular opacities. Additional smaller nodular opacities in all 5 lobes. Findings most likely infectious in nature. Correlate with symptoms. Given the indicated concern for malignancy, follow up in one month is recommended for resolution and re-evaluation  - Cont zosyn  - BCx neg, RVP neg. MRSA pending.  - Aspiration precaution.   - S&S cs: pureed diet - however patient is not eating    #Hydrocele  - Scrotal swelling --> urology consulted to r/o inguinal hernia vs hydrocele  - Scrotal US done: Massive right hydrocele containing debris, total volume of 188 cc. Differential includes hemorrhage versus infection.  - Urology informed --> no acute intervention. FU OP    #Macrocytic anemia  - Hb 11.9, .6  -  B12, folate unremarkable     #Bradycardia  - HR dropped 39-40s when asleep  - BP stable  - Monitor and EKG showed sinus bradycardia  - Improvement when awake, suspect degree of sleep apnea as well especially when HR dropping while sleeping  - TSH elevated to mid 5s. Get T4-> start synthroid if low   - still bradycardic    #HTN   - hold anti-HTN medication for now.     #Alzheimer dementia   - Hold donepezil for now.  - C/w seroquel 12.5mg BID    #BPH   - c/w finasteride 5mg od    #MISC  DVT ppx: heparin  GI ppx: not indicated  AAT  Diet: pureed    pending: pending hospice acceptance - home hospice preferred. continue to monitor hypernatremia - improving, was off D5 1/2, reinstated 12/25    I personally reviewed labs and imaging and ordered necessary testing/medications  I discussed care of the patient with licensed providers  I personally reviewed chart and consultant recommendations  Pt has complex medical issues that require extensive diagnosis and intervention / threat to life  I have personally spent 35 minutes of time on the encounter which excludes teaching and separately reported services

## 2024-12-25 NOTE — PROGRESS NOTE ADULT - SUBJECTIVE AND OBJECTIVE BOX
Nephrology progress note     still lethargic    ON events/Subjective:     Allergies:  No Known Allergies    Hospital Medications:   MEDICATIONS  (STANDING):  aspirin  chewable 81 milliGRAM(s) Oral daily  chlorhexidine 2% Cloths 1 Application(s) Topical daily  dextrose 5% + sodium chloride 0.45%. 1000 milliLiter(s) (100 mL/Hr) IV Continuous <Continuous>  finasteride 5 milliGRAM(s) Oral daily  heparin   Injectable 5000 Unit(s) SubCutaneous every 12 hours  piperacillin/tazobactam IVPB.. 3.375 Gram(s) IV Intermittent every 8 hours  QUEtiapine 12.5 milliGRAM(s) Oral two times a day    REVIEW OF SYSTEMS:  unable to obtain  All other review of systems is negative unless indicated above.    VITALS:  T(F): 97 (12-25-24 @ 04:14), Max: 97.5 (12-24-24 @ 20:04)  HR: 72 (12-25-24 @ 04:14)  BP: 146/53 (12-25-24 @ 04:14)  RR: 18 (12-25-24 @ 04:14)  SpO2: 99% (12-25-24 @ 04:14)  Wt(kg): --    12-23 @ 07:01  -  12-24 @ 07:00  --------------------------------------------------------  IN: 700 mL / OUT: 0 mL / NET: 700 mL    12-24 @ 07:01  -  12-25 @ 07:00  --------------------------------------------------------  IN: 0 mL / OUT: 250 mL / NET: -250 mL        PHYSICAL EXAM:  Constitutional: NAD  HEENT: anicteric sclera, oropharynx clear, MMM  Neck: No JVD  Respiratory: CTAB, no wheezes, rales or rhonchi  Cardiovascular: S1, S2, RRR  Gastrointestinal: BS+, soft, NT/ND  Extremities: No cyanosis or clubbing. No peripheral edema  Neurological: A/O x 3, no focal deficits  Psychiatric: Normal mood, normal affect  : No CVA tenderness. No nieto.   Skin: No rashes  Vascular Access:    LABS:  12-25    149[H]  |  118[H]  |  17  ----------------------------<  69[L]  3.7   |  25  |  1.4    Ca    7.4[L]      25 Dec 2024 05:03  Phos  2.1     12-25  Mg     2.0     12-25    TPro  5.3[L]  /  Alb  2.1[L]  /  TBili  0.5  /  DBili      /  AST  72[H]  /  ALT  40  /  AlkPhos  77  12-25                          11.4   6.58  )-----------( 151      ( 25 Dec 2024 05:03 )             37.1       Urine Studies:  Creatinine Trend: 1.4<--, 1.4<--, 1.7<--, 1.6<--, 1.8<--, 1.7<--  Urinalysis Basic - ( 25 Dec 2024 05:03 )    Color:  / Appearance:  / SG:  / pH:   Gluc: 69 mg/dL / Ketone:   / Bili:  / Urobili:    Blood:  / Protein:  / Nitrite:    Leuk Esterase:  / RBC:  / WBC    Sq Epi:  / Non Sq Epi:  / Bacteria:       Sodium, Random Urine: 50.0 mmoL/L (12-20 @ 19:08)  Osmolality, Random Urine: 812 mos/kg (12-20 @ 19:08)    admitted from NH with pneumonia and severe hypernatremia and ANGEL  ·hypernatremia likley from volume depletion from decreased po intake - is improving on ivf  ·ANGEL improved on ivf  ·hemodynamics stable  ·pneumonia (? aspiration ) on zosyn    1) suggest to continue D51/2 NS at 100 cc/hr until sodium < 145  2) abx per Medicine - zosyn  3) am labs  4) feed as tolerates  5) consider stopping seroquel

## 2024-12-25 NOTE — PROGRESS NOTE ADULT - SUBJECTIVE AND OBJECTIVE BOX
Patient is a 84y old  Male who presents with a chief complaint of ANGEL (12-25-24)      Pt seen and examined at bedside. pt resting comfortably, hospice referral sent out          PAST MEDICAL & SURGICAL HISTORY:  CAD (coronary artery disease)    Central pain syndrome    BPH (benign prostatic hyperplasia)    Benign essential HTN    Vitamin D deficiency    Alzheimer disease        VITAL SIGNS (Last 24 hrs):  T(C): 36.1 (12-25-24 @ 12:06), Max: 36.4 (12-24-24 @ 20:04)  HR: 76 (12-25-24 @ 12:06) (72 - 78)  BP: 127/69 (12-25-24 @ 12:06) (123/65 - 146/53)  RR: 18 (12-25-24 @ 12:06) (18 - 18)  SpO2: 99% (12-25-24 @ 04:14) (99% - 99%)  Wt(kg): --  Daily     Daily     I&O's Summary    24 Dec 2024 07:01  -  25 Dec 2024 07:00  --------------------------------------------------------  IN: 0 mL / OUT: 250 mL / NET: -250 mL        PHYSICAL EXAM:  GENERAL: NAD, well-developed  HEAD:  Atraumatic, Normocephalic  EYES: EOMI, PERRLA, conjunctiva and sclera clear  NECK: Supple, No JVD  CHEST/LUNG: Clear to auscultation bilaterally; No wheeze  HEART: Regular rate and rhythm; No murmurs, rubs, or gallops  ABDOMEN: Soft, Nontender, Nondistended; Bowel sounds present  EXTREMITIES:  2+ Peripheral Pulses, No clubbing, cyanosis, or edema  NEUROLOGY: non-focal, no sensory deficits   SKIN: No rashes or lesions    Labs Reviewed  Spoke to patient in regards to abnormal labs.    CBC Full  -  ( 25 Dec 2024 05:03 )  WBC Count : 6.58 K/uL  Hemoglobin : 11.4 g/dL  Hematocrit : 37.1 %  Platelet Count - Automated : 151 K/uL  Mean Cell Volume : 99.7 fL  Mean Cell Hemoglobin : 30.6 pg  Mean Cell Hemoglobin Concentration : 30.7 g/dL  Auto Neutrophil # : 4.42 K/uL  Auto Lymphocyte # : 1.54 K/uL  Auto Monocyte # : 0.32 K/uL  Auto Eosinophil # : 0.24 K/uL  Auto Basophil # : 0.02 K/uL  Auto Neutrophil % : 67.2 %  Auto Lymphocyte % : 23.4 %  Auto Monocyte % : 4.9 %  Auto Eosinophil % : 3.6 %  Auto Basophil % : 0.3 %    BMP:    12-25 @ 05:03    Blood Urea Nitrogen - 17  Calcium - 7.4  Carbond Dioxide - 25  Chloride - 118  Creatinine - 1.4  Glucose - 69  Potassium - 3.7  Sodium - 149      Hemoglobin A1c -     Urine Culture:  12-20 @ 21:45 Urine culture: --    Culture Results:   No growth at 4 days  Method Type: --  Organism: --  Organism Identification: --  Specimen Source: .Blood BLOOD        COVID Labs  CRP:      D-Dimer:            Imaging reviewed independently and reviewed read        MEDICATIONS  (STANDING):  aspirin  chewable 81 milliGRAM(s) Oral daily  chlorhexidine 2% Cloths 1 Application(s) Topical daily  dextrose 5% + sodium chloride 0.45%. 1000 milliLiter(s) (80 mL/Hr) IV Continuous <Continuous>  finasteride 5 milliGRAM(s) Oral daily  heparin   Injectable 5000 Unit(s) SubCutaneous every 12 hours  piperacillin/tazobactam IVPB.. 3.375 Gram(s) IV Intermittent every 8 hours  QUEtiapine 12.5 milliGRAM(s) Oral two times a day    MEDICATIONS  (PRN):

## 2024-12-26 LAB
ALBUMIN SERPL ELPH-MCNC: 2.1 G/DL — LOW (ref 3.5–5.2)
ALP SERPL-CCNC: 88 U/L — SIGNIFICANT CHANGE UP (ref 30–115)
ALT FLD-CCNC: 48 U/L — HIGH (ref 0–41)
ANION GAP SERPL CALC-SCNC: 9 MMOL/L — SIGNIFICANT CHANGE UP (ref 7–14)
AST SERPL-CCNC: 86 U/L — HIGH (ref 0–41)
BASOPHILS # BLD AUTO: 0.03 K/UL — SIGNIFICANT CHANGE UP (ref 0–0.2)
BASOPHILS NFR BLD AUTO: 0.5 % — SIGNIFICANT CHANGE UP (ref 0–1)
BILIRUB SERPL-MCNC: 0.4 MG/DL — SIGNIFICANT CHANGE UP (ref 0.2–1.2)
BUN SERPL-MCNC: 15 MG/DL — SIGNIFICANT CHANGE UP (ref 10–20)
CALCIUM SERPL-MCNC: 7.5 MG/DL — LOW (ref 8.4–10.5)
CHLORIDE SERPL-SCNC: 115 MMOL/L — HIGH (ref 98–110)
CO2 SERPL-SCNC: 24 MMOL/L — SIGNIFICANT CHANGE UP (ref 17–32)
CREAT SERPL-MCNC: 1.4 MG/DL — SIGNIFICANT CHANGE UP (ref 0.7–1.5)
CULTURE RESULTS: SIGNIFICANT CHANGE UP
CULTURE RESULTS: SIGNIFICANT CHANGE UP
EGFR: 50 ML/MIN/1.73M2 — LOW
EOSINOPHIL # BLD AUTO: 0.2 K/UL — SIGNIFICANT CHANGE UP (ref 0–0.7)
EOSINOPHIL NFR BLD AUTO: 3.3 % — SIGNIFICANT CHANGE UP (ref 0–8)
GLUCOSE SERPL-MCNC: 107 MG/DL — HIGH (ref 70–99)
HCT VFR BLD CALC: 36.2 % — LOW (ref 42–52)
HGB BLD-MCNC: 11.5 G/DL — LOW (ref 14–18)
IMM GRANULOCYTES NFR BLD AUTO: 0.8 % — HIGH (ref 0.1–0.3)
LYMPHOCYTES # BLD AUTO: 1.4 K/UL — SIGNIFICANT CHANGE UP (ref 1.2–3.4)
LYMPHOCYTES # BLD AUTO: 23.4 % — SIGNIFICANT CHANGE UP (ref 20.5–51.1)
MAGNESIUM SERPL-MCNC: 1.9 MG/DL — SIGNIFICANT CHANGE UP (ref 1.8–2.4)
MCHC RBC-ENTMCNC: 31.1 PG — HIGH (ref 27–31)
MCHC RBC-ENTMCNC: 31.8 G/DL — LOW (ref 32–37)
MCV RBC AUTO: 97.8 FL — HIGH (ref 80–94)
MONOCYTES # BLD AUTO: 0.4 K/UL — SIGNIFICANT CHANGE UP (ref 0.1–0.6)
MONOCYTES NFR BLD AUTO: 6.7 % — SIGNIFICANT CHANGE UP (ref 1.7–9.3)
NEUTROPHILS # BLD AUTO: 3.9 K/UL — SIGNIFICANT CHANGE UP (ref 1.4–6.5)
NEUTROPHILS NFR BLD AUTO: 65.3 % — SIGNIFICANT CHANGE UP (ref 42.2–75.2)
NRBC # BLD: 0 /100 WBCS — SIGNIFICANT CHANGE UP (ref 0–0)
PHOSPHATE SERPL-MCNC: 2.2 MG/DL — SIGNIFICANT CHANGE UP (ref 2.1–4.9)
PLATELET # BLD AUTO: 185 K/UL — SIGNIFICANT CHANGE UP (ref 130–400)
PMV BLD: 11.8 FL — HIGH (ref 7.4–10.4)
POTASSIUM SERPL-MCNC: 3.5 MMOL/L — SIGNIFICANT CHANGE UP (ref 3.5–5)
POTASSIUM SERPL-SCNC: 3.5 MMOL/L — SIGNIFICANT CHANGE UP (ref 3.5–5)
PROT SERPL-MCNC: 5.4 G/DL — LOW (ref 6–8)
RBC # BLD: 3.7 M/UL — LOW (ref 4.7–6.1)
RBC # FLD: 13.1 % — SIGNIFICANT CHANGE UP (ref 11.5–14.5)
SODIUM SERPL-SCNC: 148 MMOL/L — HIGH (ref 135–146)
SPECIMEN SOURCE: SIGNIFICANT CHANGE UP
SPECIMEN SOURCE: SIGNIFICANT CHANGE UP
WBC # BLD: 5.98 K/UL — SIGNIFICANT CHANGE UP (ref 4.8–10.8)
WBC # FLD AUTO: 5.98 K/UL — SIGNIFICANT CHANGE UP (ref 4.8–10.8)

## 2024-12-26 PROCEDURE — 99231 SBSQ HOSP IP/OBS SF/LOW 25: CPT

## 2024-12-26 RX ADMIN — CHLORHEXIDINE GLUCONATE 1 APPLICATION(S): 1.2 RINSE ORAL at 11:27

## 2024-12-26 RX ADMIN — SODIUM CHLORIDE 80 MILLILITER(S): 9 INJECTION, SOLUTION INTRAVENOUS at 11:26

## 2024-12-26 RX ADMIN — PIPERACILLIN AND TAZOBACTAM 25 GRAM(S): 3; .375 INJECTION, POWDER, LYOPHILIZED, FOR SOLUTION INTRAVENOUS at 05:37

## 2024-12-26 RX ADMIN — HEPARIN SODIUM 5000 UNIT(S): 1000 INJECTION, SOLUTION INTRAVENOUS; SUBCUTANEOUS at 18:14

## 2024-12-26 RX ADMIN — PIPERACILLIN AND TAZOBACTAM 25 GRAM(S): 3; .375 INJECTION, POWDER, LYOPHILIZED, FOR SOLUTION INTRAVENOUS at 14:16

## 2024-12-26 RX ADMIN — PIPERACILLIN AND TAZOBACTAM 25 GRAM(S): 3; .375 INJECTION, POWDER, LYOPHILIZED, FOR SOLUTION INTRAVENOUS at 22:35

## 2024-12-26 RX ADMIN — HEPARIN SODIUM 5000 UNIT(S): 1000 INJECTION, SOLUTION INTRAVENOUS; SUBCUTANEOUS at 05:37

## 2024-12-26 NOTE — SWALLOW BEDSIDE ASSESSMENT ADULT - SWALLOW EVAL: RECOMMENDED FEEDING/EATING TECHNIQUES
allow for swallow between intakes/oral hygiene/small sips/bites
oral hygiene
allow for swallow between intakes/alternate food with liquid/maintain upright posture during/after eating for 30 mins/oral hygiene

## 2024-12-26 NOTE — PROGRESS NOTE ADULT - ATTENDING COMMENTS
83 yo M with hx of HTN, CAD, BPH and Alzheimer dementia  presents from NH for hypernatremia.     on D5 1/2 for hypernatremia  no peg for dysphagia - HOSPICE eval pending   on zosyn for RLL pna treatment  sublicnial hypothyroidism with TSH in 5 region and normal T4, hold any treatment right now   hydrocele, no acute intervention  macrocytic anemia B12, folate unremarkable     rest as above    DVT ppx: heparin  GI ppx: not indicated  AAT  Diet: pureed    pending: pending hospice acceptance - home hospice preferred. continue to monitor hypernatremia - improving, was off D5 1/2, reinstated 12/25    I personally reviewed labs and imaging and ordered necessary testing/medications  I discussed care of the patient with licensed providers  I personally reviewed chart and consultant recommendations  Pt has complex medical issues that require extensive diagnosis and intervention / threat to life  I have personally 25 minutes of time on the encounter which excludes teaching and separately reported services

## 2024-12-26 NOTE — PROGRESS NOTE ADULT - SUBJECTIVE AND OBJECTIVE BOX
SUBJECTIVE/OVERNIGHT EVENTS  Today is hospital day 6d. This morning patient was seen and examined at bedside, resting comfortably in bed. No acute or major events overnight.    MEDICATIONS  STANDING MEDICATIONS  aspirin  chewable 81 milliGRAM(s) Oral daily  chlorhexidine 2% Cloths 1 Application(s) Topical daily  dextrose 5% + sodium chloride 0.45%. 1000 milliLiter(s) IV Continuous <Continuous>  finasteride 5 milliGRAM(s) Oral daily  heparin   Injectable 5000 Unit(s) SubCutaneous every 12 hours  piperacillin/tazobactam IVPB.. 3.375 Gram(s) IV Intermittent every 8 hours  QUEtiapine 12.5 milliGRAM(s) Oral two times a day    PRN MEDICATIONS    VITALS  T(F): 98.2 (12-26-24 @ 04:55), Max: 98.2 (12-26-24 @ 04:55)  HR: 46 (12-26-24 @ 04:55) (46 - 82)  BP: 147/65 (12-26-24 @ 04:55) (127/65 - 147/65)  RR: 17 (12-26-24 @ 04:55) (17 - 18)  SpO2: --    PHYSICAL EXAM  GENERAL  ( X ) NAD, lying in bed comfortably     (  ) obtunded     (  ) lethargic     (  ) somnolent    HEAD  (  ) Atraumatic     (  ) hematoma     (  ) laceration (specify location:       )     NECK  (  ) Supple     (  ) neck stiffness     (  ) nuchal rigidity     (  )  no JVD     (  ) JVD present ( -- cm)    HEART  Rate -->  ( X ) normal rate    (  ) bradycardic    (  ) tachycardic  Rhythm -->  (  ) regular    (  ) regularly irregular    (  ) irregularly irregular  Murmurs -->  (  ) normal s1/s2    (  ) systolic murmur    (  ) diastolic murmur    (  ) continuous murmur     (  ) S3 present    (  ) S4 present    LUNGS  ( X )Unlabored respirations     (  ) tachypnea  (  ) B/L air entry     (  ) decreased breath sounds in:  (location     )    (  ) no adventitious sound     (  ) crackles     (  ) wheezing      (  ) rhonchi      (specify location:       )  (  ) chest wall tenderness (specify location:       )    ABDOMEN  (  ) Soft     (  ) tense   |   (  ) nondistended     (  ) distended   |   (  ) +BS     (  ) hypoactive bowel sounds     (  ) hyperactive bowel sounds  (  ) nontender     (  ) RUQ tenderness     (  ) RLQ tenderness     (  ) LLQ tenderness     (  ) epigastric tenderness     (  ) diffuse tenderness  (  ) Splenomegaly      (  ) Hepatomegaly      (  ) Jaundice     (  ) ecchymosis     EXTREMITIES  (  ) Normal     (  ) Rash     (  ) ecchymosis     (  ) varicose veins      (  ) pitting edema     (  ) non-pitting edema   (  ) ulceration     (  ) gangrene:     (location:     )    NERVOUS SYSTEM  (  ) A&Ox3     (  ) confused     (  ) lethargic  CN II-XII:     (  ) Intact     (  ) focal deficits  (Specify:     )   Upper extremities:     (  ) strength X/5     (  ) focal deficit (specify:    )  Lower extremities:     (  ) strength  X/5    (  ) focal deficit (specify:    )    SKIN  (  ) No rashes or lesions     (  ) maculopapular rash     (  ) pustules     (  ) vesicles     (  ) ulcer     (  ) ecchymosis     (specify location:     )    (  ) Indwelling Johnson Catheter   Date insterted:    Reason (  ) Critical illness     (  ) urinary retention    (  ) Accurate Ins/Outs Monitoring     (  ) CMO patient      LABS             11.5   5.98  )-----------( 185      ( 12-26-24 @ 05:10 )             36.2     148  |  115  |  15  -------------------------<  107   12-26-24 @ 05:10  3.5  |  24  |  1.4    Ca      7.5     12-26-24 @ 05:10  Phos   2.2     12-26-24 @ 05:10  Mg     1.9     12-26-24 @ 05:10    TPro  5.4  /  Alb  2.1  /  TBili  0.4  /  DBili  x   /  AST  86  /  ALT  48  /  AlkPhos  88  /  GGT  x     12-26-24 @ 05:10        Urinalysis Basic - ( 26 Dec 2024 05:10 )    Color: x / Appearance: x / SG: x / pH: x  Gluc: 107 mg/dL / Ketone: x  / Bili: x / Urobili: x   Blood: x / Protein: x / Nitrite: x   Leuk Esterase: x / RBC: x / WBC x   Sq Epi: x / Non Sq Epi: x / Bacteria: x          IMAGING

## 2024-12-26 NOTE — SWALLOW BEDSIDE ASSESSMENT ADULT - SLP GENERAL OBSERVATIONS
Patient awake alert report received from [address] [place] [address] patient just resting ready to go home.
pt received in bed asleep arousable +room air; Staff reports pt declined breakfast this AM.
pt with dried labial secretions , o2 via RA
pt received in bed asleep arousable +room air; Staff reports pt declined medications, poor PO acceptance and intake

## 2024-12-26 NOTE — SWALLOW BEDSIDE ASSESSMENT ADULT - SWALLOW EVAL: DIAGNOSIS
pt aversive to all PO trials despite encouragement from SLP.
limited assessment - Pt tolerated puree and controlled cup sips of thin liquids with no overt s/s of aspiration/ penetration. Pt had difficulty with mastication of chewables.
pt continues to present aversive to all PO trials despite max encouragement from SLP.

## 2024-12-26 NOTE — SWALLOW BEDSIDE ASSESSMENT ADULT - SLP PERTINENT HISTORY OF CURRENT PROBLEM
84-year-old male with past medical history of HTN, BPH, CAD, vitamin D def, and Alzheimer's dementia presents to the ED from SNF for abnormal lab result of Na 177. The pt is awake & active but non-verbal. CT Chest "Right lower lobe multifocal consolidative and nodular opacities. "
Pt is an 85 y/o M w/ PMHx: HTN, CAD, BPH and Alzheimer's dementia, presents from NH for hypernatremia. Hypernatremia 2' dehydration, RLL PNA- suspect aspiration. CT chest-> Right lower lobe multifocal consolidative and nodular opacities. Additional smaller nodular opacities in all 5 lobes. Findings most likely infectious in nature.
Pt is an 85 y/o M w/ PMHx: HTN, CAD, BPH and Alzheimer's dementia, presents from NH for hypernatremia. Hypernatremia 2' dehydration, RLL PNA- suspect aspiration. CT chest-> Right lower lobe multifocal consolidative and nodular opacities. Additional smaller nodular opacities in all 5 lobes. Findings most likely infectious in nature.

## 2024-12-26 NOTE — SWALLOW BEDSIDE ASSESSMENT ADULT - SWALLOW EVAL: RECOMMENDED DIET
unable to assess oropharyngeal swallow function, continue current diet
unable to assess oropharyngeal swallow function, continue current diet of puree w/thin liquids as tolerated
PUREE AND THIN LIQUIDS 1:1 FEED

## 2024-12-26 NOTE — PROGRESS NOTE ADULT - SUBJECTIVE AND OBJECTIVE BOX
Nephrology progress note     more alert today    ON events/Subjective:     Allergies:  No Known Allergies    Hospital Medications:   MEDICATIONS  (STANDING):  aspirin  chewable 81 milliGRAM(s) Oral daily  chlorhexidine 2% Cloths 1 Application(s) Topical daily  dextrose 5% + sodium chloride 0.45%. 1000 milliLiter(s) (80 mL/Hr) IV Continuous <Continuous>  finasteride 5 milliGRAM(s) Oral daily  heparin   Injectable 5000 Unit(s) SubCutaneous every 12 hours  piperacillin/tazobactam IVPB.. 3.375 Gram(s) IV Intermittent every 8 hours  QUEtiapine 12.5 milliGRAM(s) Oral two times a day    REVIEW OF SYSTEMS:  unable to complain  All other review of systems is negative unless indicated above.    VITALS:  T(F): 98.2 (12-26-24 @ 04:55), Max: 98.2 (12-26-24 @ 04:55)  HR: 61 (12-26-24 @ 11:50)  BP: 117/75 (12-26-24 @ 11:50)  RR: 18 (12-26-24 @ 11:50)  SpO2: --  Wt(kg): --    12-24 @ 07:01  -  12-25 @ 07:00  --------------------------------------------------------  IN: 0 mL / OUT: 250 mL / NET: -250 mL    12-25 @ 07:01  -  12-26 @ 07:00  --------------------------------------------------------  IN: 630 mL / OUT: 0 mL / NET: 630 mL        PHYSICAL EXAM:  Constitutional: NAD  HEENT: anicteric sclera, oropharynx clear, MMM  Neck: No JVD  Respiratory: CTAB, no wheezes, rales or rhonchi  Cardiovascular: S1, S2, RRR  Gastrointestinal: BS+, soft, NT/ND  Extremities: No cyanosis or clubbing. No peripheral edema  Neurological: A/O x 3, no focal deficits  Psychiatric: Normal mood, normal affect  : No CVA tenderness. No nieto.   Skin: No rashes  Vascular Access:    LABS:  12-26    148[H]  |  115[H]  |  15  ----------------------------<  107[H]  3.5   |  24  |  1.4    Ca    7.5[L]      26 Dec 2024 05:10  Phos  2.2     12-26  Mg     1.9     12-26    TPro  5.4[L]  /  Alb  2.1[L]  /  TBili  0.4  /  DBili      /  AST  86[H]  /  ALT  48[H]  /  AlkPhos  88  12-26                          11.5   5.98  )-----------( 185      ( 26 Dec 2024 05:10 )             36.2       Urine Studies:  Creatinine Trend: 1.4<--, 1.6<--, 1.4<--, 1.4<--, 1.7<--, 1.6<--  Urinalysis Basic - ( 26 Dec 2024 05:10 )    Color:  / Appearance:  / SG:  / pH:   Gluc: 107 mg/dL / Ketone:   / Bili:  / Urobili:    Blood:  / Protein:  / Nitrite:    Leuk Esterase:  / RBC:  / WBC    Sq Epi:  / Non Sq Epi:  / Bacteria:       Sodium, Random Urine: 50.0 mmoL/L (12-20 @ 19:08)  Osmolality, Random Urine: 812 mos/kg (12-20 @ 19:08)    admitted from NH with pneumonia and severe hypernatremia and ANGEL  ·hypernatremia likely from volume depletion from decreased po intake - is improving on ivf  ·ANGEL improved on ivf  ·hemodynamics stable  ·pneumonia (? aspiration ) on zosyn    1) continue D51/2 NS at 80 cc/hr until sodium < 145  2) abx per Medicine - zosyn  3) am labs  4) feed as tolerates  5) consider stopping seroquel  6) seen by palliative cvare - not eating well - hospice being considered

## 2024-12-26 NOTE — SWALLOW BEDSIDE ASSESSMENT ADULT - ASR SWALLOW ASPIRATION MONITOR
change of breathing pattern/cough/pneumonia
change of breathing pattern/oral hygiene/cough/gurgly voice/fever/pneumonia/upper respiratory infection

## 2024-12-26 NOTE — SWALLOW BEDSIDE ASSESSMENT ADULT - ADDITIONAL RECOMMENDATIONS
Continue with GOC conversations to further assess caregiver goals palliative vs comfort measures.    Reconsult ST services PRN

## 2024-12-26 NOTE — PROGRESS NOTE ADULT - ASSESSMENT
83 yo M with hx of HTN, CAD, BPH and Alzheimer dementia  presents from NH for hypernatremia.     #Hypernatremia 2/2 dehydration  #Hypokalemia   #ANGEL 2/2 prerenal 2/2 poor PO intake  - Na 175 -> 170 -> 162 -> 159-> 155 -> 149  - Crea 2.4 (baseline 1.2) --> 1.8 -> 1.4 stabilized   - Shant 50, UOsm 812  - Free water deficit 4.1L. On IV D5NS0.45 120cc/h  - continue NA improving   - Monitor BMP BID  - Family decided on hospice 12/25 - pending hospice management. Referral sent    #RLL PNA possibly 2/2 aspiration  - CT shows Right lower lobe multifocal consolidative and nodular opacities. Additional smaller nodular opacities in all 5 lobes. Findings most likely infectious in nature. Correlate with symptoms. Given the indicated concern for malignancy, follow up in one month is recommended for resolution and re-evaluation  - Cont zosyn  - BCx neg, RVP neg. MRSA pending.  - Aspiration precaution.   - S&S cs: pureed diet with ensure - however patient is not eating    #Hydrocele  - Scrotal swelling --> urology consulted to r/o inguinal hernia vs hydrocele  - Scrotal US done: Massive right hydrocele containing debris, total volume of 188 cc. Differential includes hemorrhage versus infection.  - Urology informed --> no acute intervention. FU OP    #Macrocytic anemia  - Hb 11.9, .6  -  B12, folate unremarkable     #Bradycardia  - HR dropped 39-40s when asleep  - BP stable  - Monitor and EKG showed sinus bradycardia  - Improvement when awake, suspect degree of sleep apnea as well especially when HR dropping while sleeping  - TSH elevated to mid 5s. Get T4-> start synthroid if low   - still bradycardic    #HTN   - hold anti-HTN medication for now.     #Alzheimer dementia   - Hold donepezil for now.  - C/w seroquel 12.5mg BID    #BPH   - c/w finasteride 5mg od    #MISC  DVT ppx: heparin  GI ppx: not indicated  AAT  Diet: pureed    pending: Hospice

## 2024-12-27 LAB
ANION GAP SERPL CALC-SCNC: 11 MMOL/L — SIGNIFICANT CHANGE UP (ref 7–14)
ANION GAP SERPL CALC-SCNC: 12 MMOL/L — SIGNIFICANT CHANGE UP (ref 7–14)
BASOPHILS # BLD AUTO: 0.04 K/UL — SIGNIFICANT CHANGE UP (ref 0–0.2)
BASOPHILS NFR BLD AUTO: 0.5 % — SIGNIFICANT CHANGE UP (ref 0–1)
BUN SERPL-MCNC: 10 MG/DL — SIGNIFICANT CHANGE UP (ref 10–20)
BUN SERPL-MCNC: 9 MG/DL — LOW (ref 10–20)
CALCIUM SERPL-MCNC: 7.3 MG/DL — LOW (ref 8.4–10.4)
CALCIUM SERPL-MCNC: 7.6 MG/DL — LOW (ref 8.4–10.5)
CHLORIDE SERPL-SCNC: 108 MMOL/L — SIGNIFICANT CHANGE UP (ref 98–110)
CHLORIDE SERPL-SCNC: 111 MMOL/L — HIGH (ref 98–110)
CO2 SERPL-SCNC: 18 MMOL/L — SIGNIFICANT CHANGE UP (ref 17–32)
CO2 SERPL-SCNC: 22 MMOL/L — SIGNIFICANT CHANGE UP (ref 17–32)
CREAT SERPL-MCNC: 1.4 MG/DL — SIGNIFICANT CHANGE UP (ref 0.7–1.5)
CREAT SERPL-MCNC: 1.5 MG/DL — SIGNIFICANT CHANGE UP (ref 0.7–1.5)
EGFR: 46 ML/MIN/1.73M2 — LOW
EGFR: 50 ML/MIN/1.73M2 — LOW
EOSINOPHIL # BLD AUTO: 0.22 K/UL — SIGNIFICANT CHANGE UP (ref 0–0.7)
EOSINOPHIL NFR BLD AUTO: 2.8 % — SIGNIFICANT CHANGE UP (ref 0–8)
GLUCOSE SERPL-MCNC: 71 MG/DL — SIGNIFICANT CHANGE UP (ref 70–99)
GLUCOSE SERPL-MCNC: 84 MG/DL — SIGNIFICANT CHANGE UP (ref 70–99)
HCT VFR BLD CALC: 38.6 % — LOW (ref 42–52)
HGB BLD-MCNC: 12.4 G/DL — LOW (ref 14–18)
IMM GRANULOCYTES NFR BLD AUTO: 0.8 % — HIGH (ref 0.1–0.3)
LYMPHOCYTES # BLD AUTO: 1.05 K/UL — LOW (ref 1.2–3.4)
LYMPHOCYTES # BLD AUTO: 13.3 % — LOW (ref 20.5–51.1)
MAGNESIUM SERPL-MCNC: 1.7 MG/DL — LOW (ref 1.8–2.4)
MAGNESIUM SERPL-MCNC: 1.8 MG/DL — SIGNIFICANT CHANGE UP (ref 1.8–2.4)
MCHC RBC-ENTMCNC: 30.5 PG — SIGNIFICANT CHANGE UP (ref 27–31)
MCHC RBC-ENTMCNC: 32.1 G/DL — SIGNIFICANT CHANGE UP (ref 32–37)
MCV RBC AUTO: 94.8 FL — HIGH (ref 80–94)
MONOCYTES # BLD AUTO: 0.33 K/UL — SIGNIFICANT CHANGE UP (ref 0.1–0.6)
MONOCYTES NFR BLD AUTO: 4.2 % — SIGNIFICANT CHANGE UP (ref 1.7–9.3)
NEUTROPHILS # BLD AUTO: 6.17 K/UL — SIGNIFICANT CHANGE UP (ref 1.4–6.5)
NEUTROPHILS NFR BLD AUTO: 78.4 % — HIGH (ref 42.2–75.2)
NRBC # BLD: 0 /100 WBCS — SIGNIFICANT CHANGE UP (ref 0–0)
PLATELET # BLD AUTO: 222 K/UL — SIGNIFICANT CHANGE UP (ref 130–400)
PMV BLD: 11.3 FL — HIGH (ref 7.4–10.4)
POTASSIUM SERPL-MCNC: 2.9 MMOL/L — LOW (ref 3.5–5)
POTASSIUM SERPL-MCNC: 4.2 MMOL/L — SIGNIFICANT CHANGE UP (ref 3.5–5)
POTASSIUM SERPL-SCNC: 2.9 MMOL/L — LOW (ref 3.5–5)
POTASSIUM SERPL-SCNC: 4.2 MMOL/L — SIGNIFICANT CHANGE UP (ref 3.5–5)
RBC # BLD: 4.07 M/UL — LOW (ref 4.7–6.1)
RBC # FLD: 13.1 % — SIGNIFICANT CHANGE UP (ref 11.5–14.5)
SODIUM SERPL-SCNC: 141 MMOL/L — SIGNIFICANT CHANGE UP (ref 135–146)
SODIUM SERPL-SCNC: 141 MMOL/L — SIGNIFICANT CHANGE UP (ref 135–146)
WBC # BLD: 7.87 K/UL — SIGNIFICANT CHANGE UP (ref 4.8–10.8)
WBC # FLD AUTO: 7.87 K/UL — SIGNIFICANT CHANGE UP (ref 4.8–10.8)

## 2024-12-27 PROCEDURE — 99231 SBSQ HOSP IP/OBS SF/LOW 25: CPT

## 2024-12-27 RX ORDER — SODIUM CHLORIDE 9 MG/ML
1000 INJECTION, SOLUTION INTRAVENOUS
Refills: 0 | Status: DISCONTINUED | OUTPATIENT
Start: 2024-12-27 | End: 2024-12-28

## 2024-12-27 RX ORDER — POTASSIUM CHLORIDE 600 MG/1
20 TABLET, FILM COATED, EXTENDED RELEASE ORAL
Refills: 0 | Status: COMPLETED | OUTPATIENT
Start: 2024-12-27 | End: 2024-12-27

## 2024-12-27 RX ADMIN — PIPERACILLIN AND TAZOBACTAM 25 GRAM(S): 3; .375 INJECTION, POWDER, LYOPHILIZED, FOR SOLUTION INTRAVENOUS at 06:03

## 2024-12-27 RX ADMIN — POTASSIUM CHLORIDE 50 MILLIEQUIVALENT(S): 600 TABLET, FILM COATED, EXTENDED RELEASE ORAL at 15:35

## 2024-12-27 RX ADMIN — CHLORHEXIDINE GLUCONATE 1 APPLICATION(S): 1.2 RINSE ORAL at 11:37

## 2024-12-27 RX ADMIN — SODIUM CHLORIDE 80 MILLILITER(S): 9 INJECTION, SOLUTION INTRAVENOUS at 03:25

## 2024-12-27 RX ADMIN — HEPARIN SODIUM 5000 UNIT(S): 1000 INJECTION, SOLUTION INTRAVENOUS; SUBCUTANEOUS at 06:04

## 2024-12-27 RX ADMIN — POTASSIUM CHLORIDE 50 MILLIEQUIVALENT(S): 600 TABLET, FILM COATED, EXTENDED RELEASE ORAL at 17:51

## 2024-12-27 RX ADMIN — HEPARIN SODIUM 5000 UNIT(S): 1000 INJECTION, SOLUTION INTRAVENOUS; SUBCUTANEOUS at 17:48

## 2024-12-27 RX ADMIN — SODIUM CHLORIDE 60 MILLILITER(S): 9 INJECTION, SOLUTION INTRAVENOUS at 20:14

## 2024-12-27 RX ADMIN — POTASSIUM CHLORIDE 50 MILLIEQUIVALENT(S): 600 TABLET, FILM COATED, EXTENDED RELEASE ORAL at 20:14

## 2024-12-27 NOTE — PROGRESS NOTE ADULT - SUBJECTIVE AND OBJECTIVE BOX
Nephrology progress note     alert at times    ON events/Subjective:     Allergies:  No Known Allergies    Hospital Medications:   MEDICATIONS  (STANDING):  aspirin  chewable 81 milliGRAM(s) Oral daily  chlorhexidine 2% Cloths 1 Application(s) Topical daily  dextrose 5% + sodium chloride 0.45%. 1000 milliLiter(s) (80 mL/Hr) IV Continuous <Continuous>  finasteride 5 milliGRAM(s) Oral daily  heparin   Injectable 5000 Unit(s) SubCutaneous every 12 hours  QUEtiapine 12.5 milliGRAM(s) Oral two times a day    REVIEW OF SYSTEMS:  unable to obtain  All other review of systems is negative unless indicated above.    VITALS:  T(F): 97.9 (12-27-24 @ 05:13), Max: 98 (12-26-24 @ 20:10)  HR: 59 (12-27-24 @ 06:56)  BP: 133/61 (12-27-24 @ 06:56)  RR: 18 (12-27-24 @ 05:13)  SpO2: 99% (12-27-24 @ 07:39)  Wt(kg): --    12-25 @ 07:01  -  12-26 @ 07:00  --------------------------------------------------------  IN: 630 mL / OUT: 0 mL / NET: 630 mL    12-26 @ 07:01  -  12-27 @ 07:00  --------------------------------------------------------  IN: 160 mL / OUT: 0 mL / NET: 160 mL        PHYSICAL EXAM:  Constitutional: NAD  HEENT: anicteric sclera, oropharynx clear, MMM  Neck: No JVD  Respiratory: CTAB, no wheezes, rales or rhonchi  Cardiovascular: S1, S2, RRR  Gastrointestinal: BS+, soft, NT/ND  Extremities: No cyanosis or clubbing. No peripheral edema  Neurological: A/O x 3, no focal deficits  Psychiatric: Normal mood, normal affect  : No CVA tenderness. No nieto.   Skin: No rashes  Vascular Access:    LABS:  12-26    148[H]  |  115[H]  |  15  ----------------------------<  107[H]  3.5   |  24  |  1.4    Ca    7.5[L]      26 Dec 2024 05:10  Phos  2.2     12-26  Mg     1.9     12-26    TPro  5.4[L]  /  Alb  2.1[L]  /  TBili  0.4  /  DBili      /  AST  86[H]  /  ALT  48[H]  /  AlkPhos  88  12-26                          11.5   5.98  )-----------( 185      ( 26 Dec 2024 05:10 )             36.2       Urine Studies:  Creatinine Trend: 1.4<--, 1.6<--, 1.4<--, 1.4<--, 1.7<--, 1.6<--  Urinalysis Basic - ( 26 Dec 2024 05:10 )    Color:  / Appearance:  / SG:  / pH:   Gluc: 107 mg/dL / Ketone:   / Bili:  / Urobili:    Blood:  / Protein:  / Nitrite:    Leuk Esterase:  / RBC:  / WBC    Sq Epi:  / Non Sq Epi:  / Bacteria:       Sodium, Random Urine: 50.0 mmoL/L (12-20 @ 19:08)  Osmolality, Random Urine: 812 mos/kg (12-20 @ 19:08)    admitted from NH with pneumonia and severe hypernatremia and ANGEL  ·hypernatremia likely from volume depletion from decreased po intake - is improving on ivf  ·ANGEL improved on ivf  ·hemodynamics stable  ·pneumonia (? aspiration ) on zosyn    1) continue D51/2 NS at 80 cc/hr until sodium < 145 or until referred and accepted in hospice  2) abx per Medicine - zosyn (complete)  3) am labs per resident as unable to draw  4) feed as tolerates  5) consider stopping seroquel  6) seen by palliative cvare - not eating well - hospice being considered by family

## 2024-12-27 NOTE — PROGRESS NOTE ADULT - SUBJECTIVE AND OBJECTIVE BOX
Patient is a 84y old Male who presents with a chief complaint of ANGEL (26 Dec 2024 12:17)    Today is hospital day     Overnight events/Interval History:  - No acute overnight events  - At bedside, patient is nonverbal but awakens to voice.     ROS:  - limited 2/2 mental status     Code Status: DNR/DNI trial NIV    ALLERGIES:  No Known Allergies    MEDICATIONS:  STANDING MEDICATIONS  aspirin  chewable 81 milliGRAM(s) Oral daily  chlorhexidine 2% Cloths 1 Application(s) Topical daily  dextrose 5% + sodium chloride 0.45%. 1000 milliLiter(s) IV Continuous <Continuous>  finasteride 5 milliGRAM(s) Oral daily  heparin   Injectable 5000 Unit(s) SubCutaneous every 12 hours  QUEtiapine 12.5 milliGRAM(s) Oral two times a day    PRN MEDICATIONS      VITALS LAST 24H:  Vital Signs Last 24 Hrs  T(C): 36.6 (27 Dec 2024 05:13), Max: 36.7 (26 Dec 2024 20:10)  T(F): 97.9 (27 Dec 2024 05:13), Max: 98 (26 Dec 2024 20:10)  HR: 59 (27 Dec 2024 06:56) (52 - 71)  BP: 133/61 (27 Dec 2024 06:56) (117/43 - 133/62)  BP(mean): --  RR: 18 (27 Dec 2024 05:13) (18 - 18)  SpO2: 99% (27 Dec 2024 07:39) (99% - 100%)    Parameters below as of 27 Dec 2024 07:39  Patient On (Oxygen Delivery Method): room air        PHYSICAL EXAM:  GENERAL: NAD, lying in bed comfortably  HEENT:  dry mucous membranes  CHEST/LUNG: Clear to auscultation bilaterally; normal respiratory effort  HEART: Regular rate and rhythm  ABDOMEN: Soft, nontender, nondistended  EXTREMITIES: No lower extremity edema bilaterally  NERVOUS SYSTEM:  A&Ox0, nonverbal    LABS:                        11.5   5.98  )-----------( 185      ( 26 Dec 2024 05:10 )             36.2     12-26    148[H]  |  115[H]  |  15  ----------------------------<  107[H]  3.5   |  24  |  1.4    Ca    7.5[L]      26 Dec 2024 05:10  Phos  2.2     12-26  Mg     1.9     12-26    TPro  5.4[L]  /  Alb  2.1[L]  /  TBili  0.4  /  DBili  x   /  AST  86[H]  /  ALT  48[H]  /  AlkPhos  88  12-26      Urinalysis Basic - ( 26 Dec 2024 05:10 )    Color: x / Appearance: x / SG: x / pH: x  Gluc: 107 mg/dL / Ketone: x  / Bili: x / Urobili: x   Blood: x / Protein: x / Nitrite: x   Leuk Esterase: x / RBC: x / WBC x   Sq Epi: x / Non Sq Epi: x / Bacteria: x                RADIOLOGY:  CXR      CT           Patient is a 84y old Male who presents with a chief complaint of ANGEL (26 Dec 2024 12:17)      Overnight events/Interval History:  - No acute overnight events  - At bedside, patient is nonverbal but awakens to voice.     ROS:  - limited 2/2 mental status     Code Status: DNR/DNI trial NIV    ALLERGIES:  No Known Allergies    MEDICATIONS:  STANDING MEDICATIONS  aspirin  chewable 81 milliGRAM(s) Oral daily  chlorhexidine 2% Cloths 1 Application(s) Topical daily  dextrose 5% + sodium chloride 0.45%. 1000 milliLiter(s) IV Continuous <Continuous>  finasteride 5 milliGRAM(s) Oral daily  heparin   Injectable 5000 Unit(s) SubCutaneous every 12 hours  QUEtiapine 12.5 milliGRAM(s) Oral two times a day    PRN MEDICATIONS      VITALS LAST 24H:  Vital Signs Last 24 Hrs  T(C): 36.6 (27 Dec 2024 05:13), Max: 36.7 (26 Dec 2024 20:10)  T(F): 97.9 (27 Dec 2024 05:13), Max: 98 (26 Dec 2024 20:10)  HR: 59 (27 Dec 2024 06:56) (52 - 71)  BP: 133/61 (27 Dec 2024 06:56) (117/43 - 133/62)  BP(mean): --  RR: 18 (27 Dec 2024 05:13) (18 - 18)  SpO2: 99% (27 Dec 2024 07:39) (99% - 100%)    Parameters below as of 27 Dec 2024 07:39  Patient On (Oxygen Delivery Method): room air        PHYSICAL EXAM:  GENERAL: NAD, lying in bed comfortably  HEENT:  dry mucous membranes  CHEST/LUNG: Clear to auscultation bilaterally; normal respiratory effort  HEART: Regular rate and rhythm  ABDOMEN: Soft, nontender, nondistended  EXTREMITIES: No lower extremity edema bilaterally  NERVOUS SYSTEM:  A&Ox0, nonverbal    LABS:                        11.5   5.98  )-----------( 185      ( 26 Dec 2024 05:10 )             36.2     12-26    148[H]  |  115[H]  |  15  ----------------------------<  107[H]  3.5   |  24  |  1.4    Ca    7.5[L]      26 Dec 2024 05:10  Phos  2.2     12-26  Mg     1.9     12-26    TPro  5.4[L]  /  Alb  2.1[L]  /  TBili  0.4  /  DBili  x   /  AST  86[H]  /  ALT  48[H]  /  AlkPhos  88  12-26      Urinalysis Basic - ( 26 Dec 2024 05:10 )    Color: x / Appearance: x / SG: x / pH: x  Gluc: 107 mg/dL / Ketone: x  / Bili: x / Urobili: x   Blood: x / Protein: x / Nitrite: x   Leuk Esterase: x / RBC: x / WBC x   Sq Epi: x / Non Sq Epi: x / Bacteria: x                RADIOLOGY:  CXR      CT

## 2024-12-27 NOTE — PROGRESS NOTE ADULT - ASSESSMENT
85 yo M with hx of HTN, CAD, BPH and Alzheimer dementia  presents from NH for hypernatremia.     #Hypernatremia 2/2 dehydration - improving  #Hypokalemia - resolved   #ANGEL 2/2 prerenal 2/2 poor PO intake - improving   - Na 175, Cr 2.4 on admission (baseline 1.2)  - Shant 50, UOsm 812  - c/w D51/2NS    - Monitor BMP BID  - appreciate S&S eval, 1:1 feeding puree diet however patient is not eating   - no PEG per palliative discussions  - pending hospice referral     #RLL PNA possibly 2/2 aspiration  - CT shows Right lower lobe multifocal consolidative and nodular opacities. Additional smaller nodular opacities in all 5 lobes. Findings most likely infectious in nature. Correlate with symptoms. Given the indicated concern for malignancy, follow up in one month is recommended for resolution and re-evaluation  - BCx neg, RVP neg.  - s/p 7 day course of zosyn  - Aspiration precaution.   - S&S cs: pureed diet with ensure - however patient is not eating    #Hydrocele  - Scrotal swelling --> urology consulted to r/o inguinal hernia vs hydrocele  - Scrotal US done: Massive right hydrocele containing debris, total volume of 188 cc. Differential includes hemorrhage versus infection.  - Urology informed --> no acute intervention. FU OP    #Macrocytic anemia  - Hb 11.9, .6  -  B12, folate unremarkable     #Bradycardia  - HR dropped 39-40s when asleep  - BP stable  - Monitor and EKG showed sinus bradycardia  - Improvement when awake, suspect degree of sleep apnea as well especially when HR dropping while sleeping  - TSH elevated, T4 wnl  - monitor     #HTN   - hold anti-HTN medication for now.     #Alzheimer dementia   - Hold donepezil for now.  - C/w seroquel 12.5mg BID    #BPH   - c/w finasteride 5mg od    #MISC  DVT ppx: heparin  GI ppx: not indicated  AAT  Diet: pureed    #Handoff  - hospice referral, monitor sodium

## 2024-12-27 NOTE — PROGRESS NOTE ADULT - ATTENDING COMMENTS
83 yo M with hx of HTN, CAD, BPH and Alzheimer dementia  presents from NH for hypernatremia.     on D5 1/2 for hypernatremia, Na improving  no peg for dysphagia - HOSPICE eval pending   on zosyn for RLL pna treatment  sublicnial hypothyroidism with TSH in 5 region and normal T4, hold any treatment right now   hydrocele, no acute intervention  macrocytic anemia B12, folate unremarkable     rest as above    DVT ppx: heparin  GI ppx: not indicated  AAT  Diet: pureed    pending: pending hospice approval - attempt made today 12/27 to reach family    I personally reviewed labs and imaging and ordered necessary testing/medications  I discussed care of the patient with licensed providers  I personally reviewed chart and consultant recommendations  Pt has complex medical issues that require extensive diagnosis and intervention / threat to life  I have personally 25 minutes of time on the encounter which excludes teaching and separately reported services .

## 2024-12-28 PROCEDURE — 99231 SBSQ HOSP IP/OBS SF/LOW 25: CPT

## 2024-12-28 RX ORDER — MAGNESIUM SULFATE 500 MG/ML
2 INJECTION, SOLUTION INTRAMUSCULAR; INTRAVENOUS ONCE
Refills: 0 | Status: COMPLETED | OUTPATIENT
Start: 2024-12-28 | End: 2024-12-28

## 2024-12-28 RX ADMIN — Medication 5 MILLIGRAM(S): at 11:55

## 2024-12-28 RX ADMIN — QUETIAPINE FUMARATE 12.5 MILLIGRAM(S): 100 TABLET, FILM COATED ORAL at 18:13

## 2024-12-28 RX ADMIN — CHLORHEXIDINE GLUCONATE 1 APPLICATION(S): 1.2 RINSE ORAL at 12:04

## 2024-12-28 RX ADMIN — Medication 81 MILLIGRAM(S): at 11:55

## 2024-12-28 RX ADMIN — HEPARIN SODIUM 5000 UNIT(S): 1000 INJECTION, SOLUTION INTRAVENOUS; SUBCUTANEOUS at 05:35

## 2024-12-28 RX ADMIN — HEPARIN SODIUM 5000 UNIT(S): 1000 INJECTION, SOLUTION INTRAVENOUS; SUBCUTANEOUS at 18:13

## 2024-12-28 RX ADMIN — SODIUM CHLORIDE 60 MILLILITER(S): 9 INJECTION, SOLUTION INTRAVENOUS at 18:47

## 2024-12-28 RX ADMIN — MAGNESIUM SULFATE 25 GRAM(S): 500 INJECTION, SOLUTION INTRAMUSCULAR; INTRAVENOUS at 11:54

## 2024-12-28 NOTE — PROGRESS NOTE ADULT - ASSESSMENT
85 yo M with hx of HTN, CAD, BPH and Alzheimer dementia  presents from NH for hypernatremia.     #Hypernatremia 2/2 dehydration - improving  #Hypokalemia - resolved   #ANGEL 2/2 prerenal 2/2 poor PO intake - improving   - Na 175, Cr 2.4 on admission (baseline 1.2)  - c/w D51/2NS    - Monitor BMP BID  - appreciate S&S eval, 1:1 feeding puree diet however patient is not eating   - no PEG per palliative discussion  - pending hospice referral     #RLL PNA possibly 2/2 aspiration  - CT shows Right lower lobe multifocal consolidative and nodular opacities. Additional smaller nodular opacities in all 5 lobes. Findings most likely infectious in nature. Correlate with symptoms. Given the indicated concern for malignancy, follow up in one month is recommended for resolution and re-evaluation  - BCx neg, RVP neg.  - s/p 7 day course of zosyn  - Aspiration precaution.   - S&S cs: pureed diet with ensure - however patient is not eating    #Hydrocele  - Scrotal swelling --> urology consulted to r/o inguinal hernia vs hydrocele  - Scrotal US done: Massive right hydrocele containing debris, total volume of 188 cc. Differential includes hemorrhage versus infection.  - Urology informed --> no acute intervention. FU OP    #Macrocytic anemia  - Hb 11.9, .6  -  B12, folate unremarkable     #Bradycardia  - HR dropped 39-40s when asleep  - BP stable  - Monitor and EKG showed sinus bradycardia  - Improvement when awake, suspect degree of sleep apnea as well especially when HR dropping while sleeping  - TSH elevated, T4 wnl  - monitor     #HTN   - hold anti-HTN medication for now.     #Alzheimer dementia   - Hold donepezil for now.  - C/w Seroquel 12.5mg BID    #BPH   - c/w finasteride 5mg od       DVT ppx: heparin        #Handoff  - hospice referral

## 2024-12-28 NOTE — PROGRESS NOTE ADULT - SUBJECTIVE AND OBJECTIVE BOX
Pt seen and examined at bedside.    VITAL SIGNS (Last 24 hrs):  T(C): 36.3 (12-28-24 @ 05:17), Max: 36.7 (12-27-24 @ 21:23)  HR: 62 (12-28-24 @ 11:50) (62 - 82)  BP: 123/66 (12-28-24 @ 11:50) (123/66 - 137/73)  RR: 18 (12-28-24 @ 11:50) (18 - 18)  SpO2: 94% (12-28-24 @ 11:50) (94% - 99%)  Wt(kg): --  Daily     Daily     I&O's Summary      PHYSICAL EXAM:  GENERAL: NAD   HEAD:  Atraumatic, Normocephalic  EYES:   conjunctiva and sclera clear  NECK: Supple, No JVD  CHEST/LUNG: Clear to auscultation bilaterally; No wheeze  HEART: Regular rate and rhythm; No murmurs, rubs, or gallops  ABDOMEN: Soft, Nontender, Nondistended; Bowel sounds present  EXTREMITIES:  2+ Peripheral Pulses, No clubbing, cyanosis, or edema  SKIN: No rashes or lesions    Labs Reviewed     CBC Full  -  ( 27 Dec 2024 13:19 )  WBC Count : 7.87 K/uL  Hemoglobin : 12.4 g/dL  Hematocrit : 38.6 %  Platelet Count - Automated : 222 K/uL  Mean Cell Volume : 94.8 fL  Mean Cell Hemoglobin : 30.5 pg  Mean Cell Hemoglobin Concentration : 32.1 g/dL  Auto Neutrophil # : 6.17 K/uL  Auto Lymphocyte # : 1.05 K/uL  Auto Monocyte # : 0.33 K/uL  Auto Eosinophil # : 0.22 K/uL  Auto Basophil # : 0.04 K/uL  Auto Neutrophil % : 78.4 %  Auto Lymphocyte % : 13.3 %  Auto Monocyte % : 4.2 %  Auto Eosinophil % : 2.8 %  Auto Basophil % : 0.5 %    BMP:    12-27 @ 20:00    Blood Urea Nitrogen - 10  Calcium - 7.3  Carbond Dioxide - 18  Chloride - 111  Creatinine - 1.5  Glucose - 71  Potassium - 4.2  Sodium - 141      Hemoglobin A1c -     Urine Culture:            MEDICATIONS  (STANDING):  aspirin  chewable 81 milliGRAM(s) Oral daily  chlorhexidine 2% Cloths 1 Application(s) Topical daily  dextrose 5% + lactated ringers. 1000 milliLiter(s) (60 mL/Hr) IV Continuous <Continuous>  finasteride 5 milliGRAM(s) Oral daily  heparin   Injectable 5000 Unit(s) SubCutaneous every 12 hours  QUEtiapine 12.5 milliGRAM(s) Oral two times a day    MEDICATIONS  (PRN):

## 2024-12-28 NOTE — PROGRESS NOTE ADULT - ASSESSMENT
ANGEL, prerenal  azotemia - improving  Hypernatremia secondary to dehydration - resolved  Hypokalemia - resolved   RLL pneumonia, possible aspiration  Hydrocele  Macrocytic anemia  HTN   Alzheimer dementia   BPH     Plan:    No more IVF for now  1:1 feeding  If inadequate oral intake, will have to consider resuming IV fluids  Hospice evaluation underway

## 2024-12-28 NOTE — PROGRESS NOTE ADULT - SUBJECTIVE AND OBJECTIVE BOX
[facility] FOLLOW UP NOTE  --------------------------------------------------------------------------------  24 hour events/subjective: Patient examined. Appears comfortable.    PAST HISTORY  --------------------------------------------------------------------------------  No significant changes to PMH, PSH, FHx, SHx, unless otherwise noted    ALLERGIES & MEDICATIONS  --------------------------------------------------------------------------------  Allergies    No Known Allergies    Standing Inpatient Medications  aspirin  chewable 81 milliGRAM(s) Oral daily  chlorhexidine 2% Cloths 1 Application(s) Topical daily  dextrose 5% + lactated ringers. 1000 milliLiter(s) IV Continuous <Continuous>  finasteride 5 milliGRAM(s) Oral daily  heparin   Injectable 5000 Unit(s) SubCutaneous every 12 hours  QUEtiapine 12.5 milliGRAM(s) Oral two times a day    VITALS/PHYSICAL EXAM  --------------------------------------------------------------------------------  T(C): 36.3 (12-28-24 @ 05:17), Max: 36.7 (12-27-24 @ 21:23)  HR: 62 (12-28-24 @ 11:50) (62 - 82)  BP: 123/66 (12-28-24 @ 11:50) (123/66 - 137/73)  RR: 18 (12-28-24 @ 11:50) (18 - 18)  SpO2: 94% (12-28-24 @ 11:50) (94% - 99%)    Physical Exam:  	Gen: NAD  	Pulm: CTA B/L  	CV: RRR, S1S2  	Abd: +BS, soft, nontender/nondistended  	: No suprapubic tenderness  	LE: Warm, no edema    LABS/STUDIES  --------------------------------------------------------------------------------              12.4   7.87  >-----------<  222      [12-27-24 @ 13:19]              38.6     141  |  111  |  10  ----------------------------<  71      [12-27-24 @ 20:00]  4.2   |  18  |  1.5        Ca     7.3     [12-27-24 @ 20:00]      Mg     1.7     [12-27-24 @ 20:00]    Creatinine Trend:  SCr 1.5 [12-27 @ 20:00]  SCr 1.4 [12-27 @ 13:19]  SCr 1.4 [12-26 @ 05:10]  SCr 1.6 [12-25 @ 15:55]  SCr 1.4 [12-25 @ 05:03]    Urinalysis - [12-27-24 @ 20:00]      Color  / Appearance  / SG  / pH       Gluc 71 / Ketone   / Bili  / Urobili        Blood  / Protein  / Leuk Est  / Nitrite       RBC  / WBC  / Hyaline  / Gran  / Sq Epi  / Non Sq Epi  / Bacteria     TSH 5.63      [12-23-24 @ 16:00]

## 2024-12-29 LAB
ANION GAP SERPL CALC-SCNC: 15 MMOL/L — HIGH (ref 7–14)
BUN SERPL-MCNC: 10 MG/DL — SIGNIFICANT CHANGE UP (ref 10–20)
CALCIUM SERPL-MCNC: 7.2 MG/DL — LOW (ref 8.4–10.5)
CHLORIDE SERPL-SCNC: 108 MMOL/L — SIGNIFICANT CHANGE UP (ref 98–110)
CO2 SERPL-SCNC: 20 MMOL/L — SIGNIFICANT CHANGE UP (ref 17–32)
CREAT SERPL-MCNC: 1.3 MG/DL — SIGNIFICANT CHANGE UP (ref 0.7–1.5)
EGFR: 54 ML/MIN/1.73M2 — LOW
GLUCOSE SERPL-MCNC: 55 MG/DL — LOW (ref 70–99)
MAGNESIUM SERPL-MCNC: 2 MG/DL — SIGNIFICANT CHANGE UP (ref 1.8–2.4)
POTASSIUM SERPL-MCNC: 2.9 MMOL/L — LOW (ref 3.5–5)
POTASSIUM SERPL-SCNC: 2.9 MMOL/L — LOW (ref 3.5–5)
SODIUM SERPL-SCNC: 143 MMOL/L — SIGNIFICANT CHANGE UP (ref 135–146)

## 2024-12-29 PROCEDURE — 99231 SBSQ HOSP IP/OBS SF/LOW 25: CPT

## 2024-12-29 RX ORDER — POTASSIUM CHLORIDE 600 MG/1
20 TABLET, FILM COATED, EXTENDED RELEASE ORAL
Refills: 0 | Status: DISCONTINUED | OUTPATIENT
Start: 2024-12-29 | End: 2024-12-29

## 2024-12-29 RX ORDER — POTASSIUM CHLORIDE 600 MG/1
20 TABLET, FILM COATED, EXTENDED RELEASE ORAL
Refills: 0 | Status: COMPLETED | OUTPATIENT
Start: 2024-12-29 | End: 2024-12-30

## 2024-12-29 RX ORDER — POTASSIUM CHLORIDE 600 MG/1
40 TABLET, FILM COATED, EXTENDED RELEASE ORAL EVERY 4 HOURS
Refills: 0 | Status: COMPLETED | OUTPATIENT
Start: 2024-12-29 | End: 2024-12-29

## 2024-12-29 RX ADMIN — Medication 5 MILLIGRAM(S): at 12:35

## 2024-12-29 RX ADMIN — POTASSIUM CHLORIDE 40 MILLIEQUIVALENT(S): 600 TABLET, FILM COATED, EXTENDED RELEASE ORAL at 17:11

## 2024-12-29 RX ADMIN — POTASSIUM CHLORIDE 50 MILLIEQUIVALENT(S): 600 TABLET, FILM COATED, EXTENDED RELEASE ORAL at 23:53

## 2024-12-29 RX ADMIN — QUETIAPINE FUMARATE 12.5 MILLIGRAM(S): 100 TABLET, FILM COATED ORAL at 06:04

## 2024-12-29 RX ADMIN — HEPARIN SODIUM 5000 UNIT(S): 1000 INJECTION, SOLUTION INTRAVENOUS; SUBCUTANEOUS at 17:10

## 2024-12-29 RX ADMIN — Medication 81 MILLIGRAM(S): at 12:36

## 2024-12-29 RX ADMIN — CHLORHEXIDINE GLUCONATE 1 APPLICATION(S): 1.2 RINSE ORAL at 12:36

## 2024-12-29 RX ADMIN — HEPARIN SODIUM 5000 UNIT(S): 1000 INJECTION, SOLUTION INTRAVENOUS; SUBCUTANEOUS at 06:04

## 2024-12-29 RX ADMIN — QUETIAPINE FUMARATE 12.5 MILLIGRAM(S): 100 TABLET, FILM COATED ORAL at 18:14

## 2024-12-29 NOTE — PROGRESS NOTE ADULT - SUBJECTIVE AND OBJECTIVE BOX
[facility] FOLLOW UP NOTE  --------------------------------------------------------------------------------  24 hour events/subjective: Patient examined. Appears comfortable.    PAST HISTORY  --------------------------------------------------------------------------------  No significant changes to PMH, PSH, FHx, SHx, unless otherwise noted    ALLERGIES & MEDICATIONS  --------------------------------------------------------------------------------  Allergies    No Known Allergies      Standing Inpatient Medications  aspirin  chewable 81 milliGRAM(s) Oral daily  chlorhexidine 2% Cloths 1 Application(s) Topical daily  finasteride 5 milliGRAM(s) Oral daily  heparin   Injectable 5000 Unit(s) SubCutaneous every 12 hours  QUEtiapine 12.5 milliGRAM(s) Oral two times a day    VITALS/PHYSICAL EXAM  --------------------------------------------------------------------------------  T(C): 37 (12-29-24 @ 14:17), Max: 37 (12-28-24 @ 21:02)  HR: 83 (12-29-24 @ 14:17) (83 - 102)  BP: 130/52 (12-29-24 @ 14:17) (105/66 - 144/59)  RR: 17 (12-29-24 @ 14:17) (17 - 18)  SpO2: 96% (12-29-24 @ 14:17) (93% - 97%)    Physical Exam:  	Gen: NAD  	Pulm: CTA B/L  	CV: RRR, S1S2  	Abd: +BS, soft, nontender/nondistended  	: No suprapubic tenderness  	LE: Warm,  no edema    LABS/STUDIES  --------------------------------------------------------------------------------    143  |  108  |  10  ----------------------------<  55      [12-29-24 @ 09:59]  2.9   |  20  |  1.3        Ca     7.2     [12-29-24 @ 09:59]      Mg     2.0     [12-29-24 @ 09:59]    Creatinine Trend:  SCr 1.3 [12-29 @ 09:59]  SCr 1.5 [12-27 @ 20:00]  SCr 1.4 [12-27 @ 13:19]  SCr 1.4 [12-26 @ 05:10]  SCr 1.6 [12-25 @ 15:55]    Urinalysis - [12-29-24 @ 09:59]      Color  / Appearance  / SG  / pH       Gluc 55 / Ketone   / Bili  / Urobili        Blood  / Protein  / Leuk Est  / Nitrite       RBC  / WBC  / Hyaline  / Gran  / Sq Epi  / Non Sq Epi  / Bacteria     TSH 5.63      [12-23-24 @ 16:00]

## 2024-12-29 NOTE — PROGRESS NOTE ADULT - ASSESSMENT
83 yo M with hx of HTN, CAD, BPH and Alzheimer dementia  presents from NH for hypernatremia.     #Hypernatremia 2/2 dehydration - resolved   #Hypokalemia - resolved   #ANGEL 2/2 prerenal 2/2 poor PO intake - resolved  - Na 175, Cr 2.4 on admission (baseline 1.2)  - s/p IVF   - appreciate npehro recs  - appreciate S&S eval, 1:1 feeding puree diet however patient is not eating   - no PEG per palliative discussion  - pending hospice referral     #RLL PNA possibly 2/2 aspiration - resolved   - CT shows Right lower lobe multifocal consolidative and nodular opacities. Additional smaller nodular opacities in all 5 lobes. Findings most likely infectious in nature. Correlate with symptoms. Given the indicated concern for malignancy, follow up in one month is recommended for resolution and re-evaluation  - BCx neg, RVP neg.  - s/p 7 day course of zosyn  - Aspiration precaution.   - S&S cs: pureed diet with ensure - however patient is not eating    #Hydrocele  - Scrotal swelling --> urology consulted to r/o inguinal hernia vs hydrocele  - Scrotal US done: Massive right hydrocele containing debris, total volume of 188 cc. Differential includes hemorrhage versus infection.  - Urology informed --> no acute intervention. FU OP    #Macrocytic anemia  - Hb 11.9, .6  -  B12, folate unremarkable     #Bradycardia  - HR dropped 39-40s when asleep  - BP stable  - Monitor and EKG showed sinus bradycardia  - Improvement when awake, suspect degree of sleep apnea as well especially when HR dropping while sleeping  - TSH elevated, T4 wnl  - monitor     #HTN   - hold anti-HTN medication for now.     #Alzheimer dementia   - Hold donepezil for now.  - C/w Seroquel 12.5mg BID    #BPH   - c/w finasteride 5mg od       DVT ppx: heparin        #Handoff  - hospice referral

## 2024-12-29 NOTE — PROGRESS NOTE ADULT - ATTENDING COMMENTS
83 yo M with hx of HTN, CAD, BPH and Alzheimer dementia  presents from NH for hypernatremia.     #Hypernatremia 2/2 dehydration - improving  #Hypokalemia    #ANGEL 2/2 prerenal 2/2 poor PO intake - improving   - Na 175, Cr 2.4 on admission (baseline 1.2)  - Monitor BMP BID  - appreciate S&S eval, 1:1 feeding puree diet however patient is not eating   - no PEG per palliative discussion  - pending hospice referral     #RLL PNA possibly 2/2 aspiration  - CT shows Right lower lobe multifocal consolidative and nodular opacities. Additional smaller nodular opacities in all 5 lobes. Findings most likely infectious in nature. Correlate with symptoms. Given the indicated concern for malignancy, follow up in one month is recommended for resolution and re-evaluation  - BCx neg, RVP neg.  - s/p 7 day course of zosyn  - Aspiration precaution.   - S&S cs: pureed diet with ensure - however patient is not eating    #Hydrocele  - Scrotal swelling --> urology consulted to r/o inguinal hernia vs hydrocele  - Scrotal US done: Massive right hydrocele containing debris, total volume of 188 cc. Differential includes hemorrhage versus infection.  - Urology informed --> no acute intervention. FU OP    #Macrocytic anemia  - Hb 11.9, .6  -  B12, folate unremarkable     #Bradycardia  - HR dropped 39-40s when asleep  - BP stable  - Monitor and EKG showed sinus bradycardia  - Improvement when awake, suspect degree of sleep apnea as well especially when HR dropping while sleeping  - TSH elevated, T4 wnl  - monitor     #HTN   - hold anti-HTN medication for now.     #Alzheimer dementia   - Hold donepezil for now.  - C/w Seroquel 12.5mg BID    #BPH   - c/w finasteride 5mg od       DVT ppx: heparin        #Handoff  - hospice referral

## 2024-12-29 NOTE — PROGRESS NOTE ADULT - SUBJECTIVE AND OBJECTIVE BOX
Patient is a 84y old Male who presents with a chief complaint of ANGEL (27 Dec 2024 11:27)    Today is hospital day     Overnight events/Interval History:  - No acute overnight events  - Telemetry:   - At bedside, patient has been sleeping and eating well. Breathing comfortably on room air. Denies fevers, chills, SOB, chest pain, N/V/D/C, abdominal pain.    ROS:  - All ROS is negative unless indicated in HPI    Code Status:    ALLERGIES:  No Known Allergies    MEDICATIONS:  STANDING MEDICATIONS  aspirin  chewable 81 milliGRAM(s) Oral daily  chlorhexidine 2% Cloths 1 Application(s) Topical daily  finasteride 5 milliGRAM(s) Oral daily  heparin   Injectable 5000 Unit(s) SubCutaneous every 12 hours  QUEtiapine 12.5 milliGRAM(s) Oral two times a day    PRN MEDICATIONS      VITALS LAST 24H:  Vital Signs Last 24 Hrs  T(C): 37 (28 Dec 2024 21:02), Max: 37 (28 Dec 2024 21:02)  T(F): 98.6 (28 Dec 2024 21:02), Max: 98.6 (28 Dec 2024 21:02)  HR: 102 (28 Dec 2024 21:02) (62 - 102)  BP: 105/66 (28 Dec 2024 21:02) (105/66 - 137/73)  BP(mean): 85 (28 Dec 2024 11:50) (85 - 85)  RR: 18 (28 Dec 2024 21:02) (18 - 18)  SpO2: 97% (28 Dec 2024 21:02) (94% - 98%)    Parameters below as of 28 Dec 2024 21:02  Patient On (Oxygen Delivery Method): room air        PHYSICAL EXAM:  GENERAL: NAD, lying in bed comfortably  HEENT:  EOMI, Moist mucous membranes  CHEST/LUNG: Clear to auscultation bilaterally; normal respiratory effort, no coughing, wheezes, crackles, or rales.  HEART: Regular rate and rhythm  ABDOMEN: Soft, nontender, nondistended, Bowel sounds present  EXTREMITIES:  2+ Peripheral Pulses, brisk capillary refill. No lower extremity edema, clubbing, cyanosis bilaterally  NERVOUS SYSTEM:  A&Ox3, no focal deficits   SKIN: No rashes or lesions    LABS:                        12.4   7.87  )-----------( 222      ( 27 Dec 2024 13:19 )             38.6     12-27    141  |  111[H]  |  10  ----------------------------<  71  4.2   |  18  |  1.5    Ca    7.3[L]      27 Dec 2024 20:00  Mg     1.7     12-27        Urinalysis Basic - ( 27 Dec 2024 20:00 )    Color: x / Appearance: x / SG: x / pH: x  Gluc: 71 mg/dL / Ketone: x  / Bili: x / Urobili: x   Blood: x / Protein: x / Nitrite: x   Leuk Esterase: x / RBC: x / WBC x   Sq Epi: x / Non Sq Epi: x / Bacteria: x                RADIOLOGY:  CXR      CT           Patient is a 84y old Male who presents with a chief complaint of ANGEL (27 Dec 2024 11:27)    Today is hospital day     Overnight events/Interval History:  - No acute overnight events    Code Status:    ALLERGIES:  No Known Allergies    MEDICATIONS:  STANDING MEDICATIONS  aspirin  chewable 81 milliGRAM(s) Oral daily  chlorhexidine 2% Cloths 1 Application(s) Topical daily  finasteride 5 milliGRAM(s) Oral daily  heparin   Injectable 5000 Unit(s) SubCutaneous every 12 hours  QUEtiapine 12.5 milliGRAM(s) Oral two times a day    PRN MEDICATIONS      VITALS LAST 24H:  Vital Signs Last 24 Hrs  T(C): 37 (28 Dec 2024 21:02), Max: 37 (28 Dec 2024 21:02)  T(F): 98.6 (28 Dec 2024 21:02), Max: 98.6 (28 Dec 2024 21:02)  HR: 102 (28 Dec 2024 21:02) (62 - 102)  BP: 105/66 (28 Dec 2024 21:02) (105/66 - 137/73)  BP(mean): 85 (28 Dec 2024 11:50) (85 - 85)  RR: 18 (28 Dec 2024 21:02) (18 - 18)  SpO2: 97% (28 Dec 2024 21:02) (94% - 98%)    Parameters below as of 28 Dec 2024 21:02  Patient On (Oxygen Delivery Method): room air      LABS:                        12.4   7.87  )-----------( 222      ( 27 Dec 2024 13:19 )             38.6     12-27    141  |  111[H]  |  10  ----------------------------<  71  4.2   |  18  |  1.5    Ca    7.3[L]      27 Dec 2024 20:00  Mg     1.7     12-27        Urinalysis Basic - ( 27 Dec 2024 20:00 )    Color: x / Appearance: x / SG: x / pH: x  Gluc: 71 mg/dL / Ketone: x  / Bili: x / Urobili: x   Blood: x / Protein: x / Nitrite: x   Leuk Esterase: x / RBC: x / WBC x   Sq Epi: x / Non Sq Epi: x / Bacteria: x                RADIOLOGY:  CXR      CT

## 2024-12-29 NOTE — PROGRESS NOTE ADULT - ASSESSMENT
ANGEL, prerenal  azotemia - improving  Hypernatremia secondary to dehydration - resolved  Hypokalemia  RLL pneumonia, possible aspiration  Hydrocele  Macrocytic anemia  HTN   Alzheimer dementia   BPH     Plan:    Give KCl 60meq PO  No more IVF for now  1:1 feeding  If inadequate oral intake, will have to consider resuming IV fluids  Hospice evaluation underway

## 2024-12-30 PROCEDURE — 99231 SBSQ HOSP IP/OBS SF/LOW 25: CPT

## 2024-12-30 RX ADMIN — CHLORHEXIDINE GLUCONATE 1 APPLICATION(S): 1.2 RINSE ORAL at 13:46

## 2024-12-30 RX ADMIN — POTASSIUM CHLORIDE 50 MILLIEQUIVALENT(S): 600 TABLET, FILM COATED, EXTENDED RELEASE ORAL at 04:16

## 2024-12-30 RX ADMIN — HEPARIN SODIUM 5000 UNIT(S): 1000 INJECTION, SOLUTION INTRAVENOUS; SUBCUTANEOUS at 06:13

## 2024-12-30 RX ADMIN — POTASSIUM CHLORIDE 50 MILLIEQUIVALENT(S): 600 TABLET, FILM COATED, EXTENDED RELEASE ORAL at 02:05

## 2024-12-30 RX ADMIN — HEPARIN SODIUM 5000 UNIT(S): 1000 INJECTION, SOLUTION INTRAVENOUS; SUBCUTANEOUS at 17:32

## 2024-12-30 NOTE — PROGRESS NOTE ADULT - ASSESSMENT
83 yo M with hx of HTN, CAD, BPH and Alzheimer dementia  presents from NH for hypernatremia.     #Hypernatremia 2/2 dehydration - resolved   #Hypokalemia - resolved   #ANGEL 2/2 prerenal 2/2 poor PO intake - resolved  - Na 175, Cr 2.4 on admission (baseline 1.2)  - s/p IVF   - appreciate npehro recs  - appreciate S&S eval, 1:1 feeding puree diet however patient is not eating   - no PEG per palliative discussion  - pending hospice referral     #RLL PNA possibly 2/2 aspiration - resolved   - CT shows Right lower lobe multifocal consolidative and nodular opacities. Additional smaller nodular opacities in all 5 lobes. Findings most likely infectious in nature. Correlate with symptoms. Given the indicated concern for malignancy, follow up in one month is recommended for resolution and re-evaluation  - BCx neg, RVP neg.  - s/p 7 day course of zosyn  - Aspiration precaution.   - S&S cs: pureed diet with ensure - however patient is not eating    #Hydrocele  - Scrotal swelling --> urology consulted to r/o inguinal hernia vs hydrocele  - Scrotal US done: Massive right hydrocele containing debris, total volume of 188 cc. Differential includes hemorrhage versus infection.  - Urology informed --> no acute intervention. FU OP    #Macrocytic anemia  - Hb 11.9, .6  -  B12, folate unremarkable     #Bradycardia  - HR dropped 39-40s when asleep  - BP stable  - Monitor and EKG showed sinus bradycardia  - Improvement when awake, suspect degree of sleep apnea as well especially when HR dropping while sleeping  - TSH elevated, T4 wnl  - monitor     #HTN   - hold anti-HTN medication for now.     #Alzheimer dementia   - Hold donepezil for now.  - C/w Seroquel 12.5mg BID    #BPH   - c/w finasteride 5mg od      #DVT ppx: heparin       83 yo M with hx of HTN, CAD, BPH and Alzheimer dementia  presents from NH for hypernatremia.         #Hypernatremia 2/2 dehydration - resolved   #Hypokalemia - resolved   #ANGEL 2/2 prerenal 2/2 poor PO intake - resolved  - Na 175, Cr 2.4 on admission (baseline 1.2)  - s/p IVF   - appreciate nephro recs  - appreciate S&S eval, 1:1 feeding puree diet however patient is not eating   - no PEG per palliative discussion  - pending hospice referral     #RLL PNA possibly 2/2 aspiration - resolved   - CT shows Right lower lobe multifocal consolidative and nodular opacities. Additional smaller nodular opacities in all 5 lobes. Findings most likely infectious in nature. Correlate with symptoms. Given the indicated concern for malignancy, follow up in one month is recommended for resolution and re-evaluation  - BCx neg, RVP neg.  - s/p 7 day course of zosyn  - Aspiration precaution.   - S&S cs: pureed diet with ensure - however patient is not eating    #Hydrocele  - Scrotal swelling --> urology consulted to r/o inguinal hernia vs hydrocele  - Scrotal US done: Massive right hydrocele containing debris, total volume of 188 cc. Differential includes hemorrhage versus infection.  - Urology informed --> no acute intervention. FU OP    #Macrocytic anemia  - Hb 11.9, .6  -  B12, folate unremarkable     #Bradycardia  - HR dropped 39-40s when asleep  - BP stable  - Monitor and EKG showed sinus bradycardia  - Improvement when awake, suspect degree of sleep apnea as well especially when HR dropping while sleeping  - TSH elevated, T4 wnl  - monitor     #HTN   - hold anti-HTN medication for now.     #Alzheimer dementia   - Hold donepezil for now.  - C/w Seroquel 12.5mg BID    #BPH   - c/w finasteride 5mg od      #DVT ppx: heparin

## 2024-12-30 NOTE — PROGRESS NOTE ADULT - SUBJECTIVE AND OBJECTIVE BOX
CC: ANGEL (27 Dec 2024 11:27)    INTERVAL HPI/OVERNIGHT EVENTS:  Hospital day 10. Patient seen and examined at bedside. No overnight events. Resting in bed without new complaints. Pending hospice referral.       Vital Signs Last 24 Hrs  T(C): 36.8 (30 Dec 2024 05:15), Max: 37 (29 Dec 2024 14:17)  T(F): 98.3 (30 Dec 2024 05:15), Max: 98.6 (29 Dec 2024 14:17)  HR: 77 (30 Dec 2024 06:38) (62 - 86)  BP: 137/62 (30 Dec 2024 06:38) (127/59 - 155/73)  BP(mean): 78 (29 Dec 2024 14:17) (78 - 78)  RR: 18 (30 Dec 2024 05:15) (17 - 18)  SpO2: 95% (30 Dec 2024 05:15) (95% - 96%)    Parameters below as of 29 Dec 2024 20:05  Patient On (Oxygen Delivery Method): room air    PHYSICAL EXAM:  GENERAL: NAD   HEAD:  Atraumatic, Normocephalic  EYES:   conjunctiva and sclera clear  NECK: Supple, No JVD  CHEST/LUNG: Clear to auscultation bilaterally; No wheeze  HEART: Regular rate and rhythm; No murmurs, rubs, or gallops  ABDOMEN: Soft, Nontender, Nondistended; Bowel sounds present  EXTREMITIES:  2+ Peripheral Pulses, No clubbing, cyanosis, or edema  SKIN: No rashes or lesions    I&O's Detail            29 Dec 2024 09:59    143    |  108    |  10     ----------------------------<  55     2.9     |  20     |  1.3      Ca    7.2        29 Dec 2024 09:59  Mg     2.0       29 Dec 2024 09:59        CAPILLARY BLOOD GLUCOSE          Urinalysis Basic - ( 29 Dec 2024 09:59 )    Color: x / Appearance: x / SG: x / pH: x  Gluc: 55 mg/dL / Ketone: x  / Bili: x / Urobili: x   Blood: x / Protein: x / Nitrite: x   Leuk Esterase: x / RBC: x / WBC x   Sq Epi: x / Non Sq Epi: x / Bacteria: x        MEDICATIONS  (STANDING):  aspirin  chewable 81 milliGRAM(s) Oral daily  chlorhexidine 2% Cloths 1 Application(s) Topical daily  finasteride 5 milliGRAM(s) Oral daily  heparin   Injectable 5000 Unit(s) SubCutaneous every 12 hours  QUEtiapine 12.5 milliGRAM(s) Oral two times a day    MEDICATIONS  (PRN):      RADIOLOGY & ADDITIONAL TESTS:

## 2024-12-30 NOTE — PROGRESS NOTE ADULT - SUBJECTIVE AND OBJECTIVE BOX
Nephrology progress note       ON events/Subjective:     Allergies:  No Known Allergies    Hospital Medications:   MEDICATIONS  (STANDING):  aspirin  chewable 81 milliGRAM(s) Oral daily  chlorhexidine 2% Cloths 1 Application(s) Topical daily  finasteride 5 milliGRAM(s) Oral daily  heparin   Injectable 5000 Unit(s) SubCutaneous every 12 hours  QUEtiapine 12.5 milliGRAM(s) Oral two times a day    REVIEW OF SYSTEMS:  CONSTITUTIONAL: No weakness, fevers or chills  EYES/ENT: No visual changes;  No vertigo or throat pain   NECK: No pain or stiffness  RESPIRATORY: No cough, wheezing, hemoptysis; No shortness of breath  CARDIOVASCULAR: No chest pain or palpitations.  GASTROINTESTINAL: No abdominal or epigastric pain. No nausea, vomiting, or hematemesis; No diarrhea or constipation. No melena or hematochezia.  GENITOURINARY: No dysuria, frequency, foamy urine, urinary urgency, incontinence or hematuria  NEUROLOGICAL: No numbness or weakness  SKIN: No itching, burning, rashes, or lesions   VASCULAR: No bilateral lower extremity edema.   All other review of systems is negative unless indicated above.    VITALS:  T(F): 98.3 (12-30-24 @ 05:15), Max: 98.6 (12-29-24 @ 14:17)  HR: 77 (12-30-24 @ 06:38)  BP: 137/62 (12-30-24 @ 06:38)  RR: 18 (12-30-24 @ 05:15)  SpO2: 95% (12-30-24 @ 05:15)  Wt(kg): --      PHYSICAL EXAM:  Constitutional: NAD  HEENT: anicteric sclera, oropharynx clear, MMM  Neck: No JVD  Respiratory: CTAB, no wheezes, rales or rhonchi  Cardiovascular: S1, S2, RRR  Gastrointestinal: BS+, soft, NT/ND  Extremities: No cyanosis or clubbing. No peripheral edema  Neurological: A/O x 3, no focal deficits  Psychiatric: Normal mood, normal affect  : No CVA tenderness. No nieto.   Skin: No rashes  Vascular Access:    LABS:  12-29    143  |  108  |  10  ----------------------------<  55[L]  2.9[L]   |  20  |  1.3    Ca    7.2[L]      29 Dec 2024 09:59  Mg     2.0     12-29          Urine Studies:  Creatinine Trend: 1.3<--, 1.5<--, 1.4<--, 1.4<--, 1.6<--, 1.4<--  Urinalysis Basic - ( 29 Dec 2024 09:59 )    Color:  / Appearance:  / SG:  / pH:   Gluc: 55 mg/dL / Ketone:   / Bili:  / Urobili:    Blood:  / Protein:  / Nitrite:    Leuk Esterase:  / RBC:  / WBC    Sq Epi:  / Non Sq Epi:  / Bacteria:     admitted from NH with pneumonia and severe hypernatremia and ANGEL  ·hypernatremia likely from volume depletion from decreased po intake - is improving on ivf  ·ANGEL improved on ivf  ·hemodynamics stable  ·pneumonia (? aspiration ) sp zosyn  hypokalemia    1) off ivf  2) overall more alert  3) please recheck potassium level;  4) hospice evaluation per medicine

## 2024-12-30 NOTE — PROGRESS NOTE ADULT - ATTENDING COMMENTS
85 yo M with hx of HTN, CAD, BPH and Alzheimer dementia  presents from NH for hypernatremia.         #Hypernatremia 2/2 dehydration - improving  #Hypokalemia    #ANGEL 2/2 prerenal 2/2 poor PO intake - improving   - Na 175, Cr 2.4 on admission (baseline 1.2)  - Monitor BMP BID  - appreciate S&S eval, 1:1 feeding puree diet however patient is not eating   - no PEG per palliative discussion  - pending hospice referral     #RLL PNA possibly 2/2 aspiration  - CT shows Right lower lobe multifocal consolidative and nodular opacities. Additional smaller nodular opacities in all 5 lobes. Findings most likely infectious in nature. Correlate with symptoms. Given the indicated concern for malignancy, follow up in one month is recommended for resolution and re-evaluation  - BCx neg, RVP neg.  - s/p 7 day course of zosyn  - Aspiration precaution.   - S&S cs: pureed diet with ensure - however patient is not eating    #Hydrocele  - Scrotal swelling --> urology consulted to r/o inguinal hernia vs hydrocele  - Scrotal US done: Massive right hydrocele containing debris, total volume of 188 cc. Differential includes hemorrhage versus infection.  - Urology informed --> no acute intervention. FU OP    #Macrocytic anemia  - Hb 11.9, .6  -  B12, folate unremarkable     #Bradycardia  - HR dropped 39-40s when asleep  - BP stable  - Monitor and EKG showed sinus bradycardia  - Improvement when awake, suspect degree of sleep apnea as well especially when HR dropping while sleeping  - TSH elevated, T4 wnl  - monitor     #HTN   - hold anti-HTN medication for now.     #Alzheimer dementia   - Hold donepezil for now.  - C/w Seroquel 12.5mg BID    #BPH   - c/w finasteride 5mg od       DVT ppx: heparin        #Handoff  - hospice referral

## 2024-12-31 LAB
GLUCOSE BLDC GLUCOMTR-MCNC: 109 MG/DL — HIGH (ref 70–99)
GLUCOSE BLDC GLUCOMTR-MCNC: 159 MG/DL — HIGH (ref 70–99)
GLUCOSE BLDC GLUCOMTR-MCNC: 165 MG/DL — HIGH (ref 70–99)
GLUCOSE BLDC GLUCOMTR-MCNC: 42 MG/DL — CRITICAL LOW (ref 70–99)
GLUCOSE BLDC GLUCOMTR-MCNC: 43 MG/DL — CRITICAL LOW (ref 70–99)
GLUCOSE BLDC GLUCOMTR-MCNC: 67 MG/DL — LOW (ref 70–99)

## 2024-12-31 PROCEDURE — 99231 SBSQ HOSP IP/OBS SF/LOW 25: CPT

## 2024-12-31 RX ORDER — DEXTROSE MONOHYDRATE 25 G/50ML
50 INJECTION, SOLUTION INTRAVENOUS ONCE
Refills: 0 | Status: COMPLETED | OUTPATIENT
Start: 2024-12-31 | End: 2024-12-31

## 2024-12-31 RX ADMIN — HEPARIN SODIUM 5000 UNIT(S): 1000 INJECTION, SOLUTION INTRAVENOUS; SUBCUTANEOUS at 06:27

## 2024-12-31 RX ADMIN — DEXTROSE MONOHYDRATE 50 MILLILITER(S): 25 INJECTION, SOLUTION INTRAVENOUS at 20:04

## 2024-12-31 RX ADMIN — HEPARIN SODIUM 5000 UNIT(S): 1000 INJECTION, SOLUTION INTRAVENOUS; SUBCUTANEOUS at 18:09

## 2024-12-31 RX ADMIN — DEXTROSE MONOHYDRATE 50 MILLILITER(S): 25 INJECTION, SOLUTION INTRAVENOUS at 08:14

## 2024-12-31 RX ADMIN — QUETIAPINE FUMARATE 12.5 MILLIGRAM(S): 100 TABLET, FILM COATED ORAL at 18:09

## 2024-12-31 NOTE — PROGRESS NOTE ADULT - ASSESSMENT
83 yo M with hx of HTN, CAD, BPH and Alzheimer dementia  presents from NH for hypernatremia.     #Hypernatremia 2/2 dehydration - resolved   #Hypokalemia - resolved   #ANGEL 2/2 prerenal 2/2 poor PO intake - resolved  - Na 175, Cr 2.4 on admission (baseline 1.2)  - s/p IVF   - appreciate nephro recs  - appreciate S&S eval, 1:1 feeding puree diet however patient is not eating   - no PEG per palliative discussion  - pending hospice referral     #RLL PNA possibly 2/2 aspiration - resolved   - CT shows Right lower lobe multifocal consolidative and nodular opacities. Additional smaller nodular opacities in all 5 lobes. Findings most likely infectious in nature. Correlate with symptoms. Given the indicated concern for malignancy, follow up in one month is recommended for resolution and re-evaluation  - BCx neg, RVP neg.  - s/p 7 day course of zosyn  - Aspiration precaution.   - S&S cs: pureed diet with ensure - however patient is not eating    #Hydrocele  - Scrotal swelling --> urology consulted to r/o inguinal hernia vs hydrocele  - Scrotal US done: Massive right hydrocele containing debris, total volume of 188 cc. Differential includes hemorrhage versus infection.  - Urology informed --> no acute intervention. FU OP    #Macrocytic anemia  - Hb 11.9, .6  -  B12, folate unremarkable     #Bradycardia  - HR dropped 39-40s when asleep  - BP stable  - Monitor and EKG showed sinus bradycardia  - Improvement when awake, suspect degree of sleep apnea as well especially when HR dropping while sleeping  - TSH elevated, T4 wnl  - monitor     #HTN   - hold anti-HTN medication for now.     #Alzheimer dementia   - Hold donepezil for now.  - C/w Seroquel 12.5mg BID    #BPH   - c/w finasteride 5mg od      #DVT ppx: heparin    Pending: CM reaching out to family for hospice referral

## 2024-12-31 NOTE — PROGRESS NOTE ADULT - SUBJECTIVE AND OBJECTIVE BOX
CC: ANGEL (27 Dec 2024 11:27)    INTERVAL HPI/OVERNIGHT EVENTS:  Hospital day 11. Patient seen and examined at bedside. No overnight events. Resting in bed without new complaints. Pending hospice referral.       Vital Signs Last 24 Hrs  T(C): 36.8 (30 Dec 2024 05:15), Max: 37 (29 Dec 2024 14:17)  T(F): 98.3 (30 Dec 2024 05:15), Max: 98.6 (29 Dec 2024 14:17)  HR: 77 (30 Dec 2024 06:38) (62 - 86)  BP: 137/62 (30 Dec 2024 06:38) (127/59 - 155/73)  BP(mean): 78 (29 Dec 2024 14:17) (78 - 78)  RR: 18 (30 Dec 2024 05:15) (17 - 18)  SpO2: 95% (30 Dec 2024 05:15) (95% - 96%)    Parameters below as of 29 Dec 2024 20:05  Patient On (Oxygen Delivery Method): room air    PHYSICAL EXAM:  GENERAL: NAD   HEAD:  Atraumatic, Normocephalic  EYES:   conjunctiva and sclera clear  NECK: Supple, No JVD  CHEST/LUNG: Clear to auscultation bilaterally; No wheeze  HEART: Regular rate and rhythm; No murmurs, rubs, or gallops  ABDOMEN: Soft, Nontender, Nondistended; Bowel sounds present  EXTREMITIES:  2+ Peripheral Pulses, No clubbing, cyanosis, or edema  SKIN: No rashes or lesions    I&O's Detail            29 Dec 2024 09:59    143    |  108    |  10     ----------------------------<  55     2.9     |  20     |  1.3      Ca    7.2        29 Dec 2024 09:59  Mg     2.0       29 Dec 2024 09:59        CAPILLARY BLOOD GLUCOSE          Urinalysis Basic - ( 29 Dec 2024 09:59 )    Color: x / Appearance: x / SG: x / pH: x  Gluc: 55 mg/dL / Ketone: x  / Bili: x / Urobili: x   Blood: x / Protein: x / Nitrite: x   Leuk Esterase: x / RBC: x / WBC x   Sq Epi: x / Non Sq Epi: x / Bacteria: x        MEDICATIONS  (STANDING):  aspirin  chewable 81 milliGRAM(s) Oral daily  chlorhexidine 2% Cloths 1 Application(s) Topical daily  finasteride 5 milliGRAM(s) Oral daily  heparin   Injectable 5000 Unit(s) SubCutaneous every 12 hours  QUEtiapine 12.5 milliGRAM(s) Oral two times a day    MEDICATIONS  (PRN):      RADIOLOGY & ADDITIONAL TESTS:

## 2024-12-31 NOTE — PROGRESS NOTE ADULT - ATTENDING COMMENTS
83 yo M with hx of HTN, CAD, BPH and Alzheimer dementia  presents from NH for hypernatremia.         #Hypernatremia 2/2 dehydration - improving  #Hypokalemia    #ANGEL 2/2 prerenal 2/2 poor PO intake - improving   - Na 175, Cr 2.4 on admission (baseline 1.2)  - Monitor BMP BID  - appreciate S&S eval, 1:1 feeding puree diet however patient is not eating   - no PEG per palliative discussion  - hospice unable to reach family - can follow up as outpatient    #RLL PNA possibly 2/2 aspiration  - CT shows Right lower lobe multifocal consolidative and nodular opacities. Additional smaller nodular opacities in all 5 lobes. Findings most likely infectious in nature. Correlate with symptoms. Given the indicated concern for malignancy, follow up in one month is recommended for resolution and re-evaluation  - BCx neg, RVP neg.  - s/p 7 day course of zosyn  - Aspiration precaution.   - S&S cs: pureed diet with ensure - however patient is not eating    #Hydrocele  - Scrotal swelling --> urology consulted to r/o inguinal hernia vs hydrocele  - Scrotal US done: Massive right hydrocele containing debris, total volume of 188 cc. Differential includes hemorrhage versus infection.  - Urology informed --> no acute intervention. FU OP    #Macrocytic anemia  - Hb 11.9, .6  - B12, folate unremarkable     #Bradycardia  - HR dropped 39-40s when asleep  - BP stable  - Monitor and EKG showed sinus bradycardia  - Improvement when awake, suspect degree of sleep apnea as well especially when HR dropping while sleeping  - TSH elevated, T4 wnl  - monitor     #HTN   - hold anti-HTN medication for now.     #Alzheimer dementia   - Hold donepezil for now.  - C/w Seroquel 12.5mg BID    #BPH   - c/w finasteride 5mg od       DVT ppx: heparin      #Handoff  - placement back to [facility] with palliative services

## 2024-12-31 NOTE — PROGRESS NOTE ADULT - SUBJECTIVE AND OBJECTIVE BOX
Nephrology progress note     confused    ON events/Subjective:     Allergies:  No Known Allergies    Hospital Medications:   MEDICATIONS  (STANDING):  aspirin  chewable 81 milliGRAM(s) Oral daily  chlorhexidine 2% Cloths 1 Application(s) Topical daily  finasteride 5 milliGRAM(s) Oral daily  heparin   Injectable 5000 Unit(s) SubCutaneous every 12 hours  QUEtiapine 12.5 milliGRAM(s) Oral two times a day    REVIEW OF SYSTEMS:  unable to obtain  All other review of systems is negative unless indicated above.    VITALS:  T(F): 97.6 (12-31-24 @ 05:39), Max: 98.6 (12-30-24 @ 14:06)  HR: 66 (12-31-24 @ 12:00)  BP: 128/57 (12-31-24 @ 12:00)  RR: 18 (12-31-24 @ 12:00)  SpO2: 90% (12-31-24 @ 12:00)  Wt(kg): --      PHYSICAL EXAM:  Constitutional: NAD  HEENT: anicteric sclera, oropharynx clear, MMM  Neck: No JVD  Respiratory: CTAB, no wheezes, rales or rhonchi  Cardiovascular: S1, S2, RRR  Gastrointestinal: BS+, soft, NT/ND  Extremities: No cyanosis or clubbing. No peripheral edema  Neurological: A/O x 3, no focal deficits  Psychiatric: Normal mood, normal affect  : No CVA tenderness. No nieto.   Skin: No rashes  Vascular Access:    LABS:            Urine Studies:  Creatinine Trend: 1.3<--, 1.5<--, 1.4<--, 1.4<--, 1.6<--, 1.4<--  Urinalysis Basic - ( 29 Dec 2024 09:59 )    Color:  / Appearance:  / SG:  / pH:   Gluc: 55 mg/dL / Ketone:   / Bili:  / Urobili:    Blood:  / Protein:  / Nitrite:    Leuk Esterase:  / RBC:  / WBC    Sq Epi:  / Non Sq Epi:  / Bacteria:     admitted from NH with pneumonia and severe hypernatremia and ANGEL  ·hypernatremia likely from volume depletion from decreased po intake - is improving on ivf  ·ANGEL improved on ivf  ·hemodynamics stable  ·pneumonia (? aspiration ) sp zosyn  hypokalemia    1) per nursing with hypoglycemia and refusing all po intake - suggest (until palliative care or hospice in place) to resume ivf - D%1/2 NS at 60 cc/hr and check labs  d/w medicine team

## 2025-01-01 LAB
GLUCOSE BLDC GLUCOMTR-MCNC: 127 MG/DL — HIGH (ref 70–99)
GLUCOSE BLDC GLUCOMTR-MCNC: 164 MG/DL — HIGH (ref 70–99)
GLUCOSE BLDC GLUCOMTR-MCNC: 67 MG/DL — LOW (ref 70–99)

## 2025-01-01 PROCEDURE — 99232 SBSQ HOSP IP/OBS MODERATE 35: CPT

## 2025-01-01 RX ORDER — SODIUM CHLORIDE 9 MG/ML
1000 INJECTION, SOLUTION INTRAVENOUS
Refills: 0 | Status: DISCONTINUED | OUTPATIENT
Start: 2025-01-01 | End: 2025-01-04

## 2025-01-01 RX ORDER — DEXTROSE MONOHYDRATE 25 G/50ML
50 INJECTION, SOLUTION INTRAVENOUS ONCE
Refills: 0 | Status: COMPLETED | OUTPATIENT
Start: 2025-01-01 | End: 2025-01-01

## 2025-01-01 RX ADMIN — Medication 81 MILLIGRAM(S): at 12:23

## 2025-01-01 RX ADMIN — QUETIAPINE FUMARATE 12.5 MILLIGRAM(S): 100 TABLET, FILM COATED ORAL at 17:53

## 2025-01-01 RX ADMIN — Medication 5 MILLIGRAM(S): at 12:23

## 2025-01-01 RX ADMIN — SODIUM CHLORIDE 60 MILLILITER(S): 9 INJECTION, SOLUTION INTRAVENOUS at 18:04

## 2025-01-01 RX ADMIN — HEPARIN SODIUM 5000 UNIT(S): 1000 INJECTION, SOLUTION INTRAVENOUS; SUBCUTANEOUS at 17:54

## 2025-01-01 RX ADMIN — CHLORHEXIDINE GLUCONATE 1 APPLICATION(S): 1.2 RINSE ORAL at 12:24

## 2025-01-01 RX ADMIN — DEXTROSE MONOHYDRATE 50 MILLILITER(S): 25 INJECTION, SOLUTION INTRAVENOUS at 07:01

## 2025-01-01 NOTE — PROGRESS NOTE ADULT - SUBJECTIVE AND OBJECTIVE BOX
Nephrology progress note     alert but not conversive    ON events/Subjective:     Allergies:  No Known Allergies    Hospital Medications:   MEDICATIONS  (STANDING):  aspirin  chewable 81 milliGRAM(s) Oral daily  chlorhexidine 2% Cloths 1 Application(s) Topical daily  finasteride 5 milliGRAM(s) Oral daily  heparin   Injectable 5000 Unit(s) SubCutaneous every 12 hours  QUEtiapine 12.5 milliGRAM(s) Oral two times a day    REVIEW OF SYSTEMS:  unable to obtain  All other review of systems is negative unless indicated above.    VITALS:  T(F): 98.1 (01-01-25 @ 05:17), Max: 98.1 (01-01-25 @ 05:17)  HR: 68 (01-01-25 @ 05:17)  BP: 123/61 (01-01-25 @ 05:17)  RR: 18 (01-01-25 @ 05:17)  SpO2: 98% (01-01-25 @ 05:17)  Wt(kg): --      PHYSICAL EXAM:  Constitutional: NAD  HEENT: anicteric sclera, oropharynx clear, MMM  Neck: No JVD  Respiratory: CTAB, no wheezes, rales or rhonchi  Cardiovascular: S1, S2, RRR  Gastrointestinal: BS+, soft, NT/ND  Extremities: No cyanosis or clubbing. No peripheral edema  Neurological: A/O x 3, no focal deficits  Psychiatric: Normal mood, normal affect  : No CVA tenderness. No nieto.   Skin: No rashes  Vascular Access:    LABS:            Urine Studies:  Creatinine Trend: 1.3<--, 1.5<--, 1.4<--, 1.4<--, 1.6<--, 1.4<--  Urinalysis Basic - ( 29 Dec 2024 09:59 )    Color:  / Appearance:  / SG:  / pH:   Gluc: 55 mg/dL / Ketone:   / Bili:  / Urobili:    Blood:  / Protein:  / Nitrite:    Leuk Esterase:  / RBC:  / WBC    Sq Epi:  / Non Sq Epi:  / Bacteria:     admitted from NH with pneumonia and severe hypernatremia and ANGEL  ·hypernatremia likely from volume depletion from decreased po intake - is improving on ivf  ·ANGEL improved on ivf  ·hemodynamics stable  ·pneumonia (? aspiration ) s/p zosyn  hypokalemia    1) per nursing with hypoglycemia (yesterday) and refusing all po intake (although ate a bit today - suggest (until palliative care or hospice in place) to resume ivf - D51/2 NS at 60 cc/hr and check labs  have d/w medicine team

## 2025-01-01 NOTE — PROGRESS NOTE ADULT - SUBJECTIVE AND OBJECTIVE BOX
MEDICINE ATTENDING PROGRESS NOTE  HPI:  84-year-old male with past medical history of HTN, BPH, CAD, vitamin D def, and Alzheimer's dementia presents to the ED from SNF for abnormal lab result of Na 177. The patient is awake and active but non-verbal.  Called son on both the numbers provided on NH paperwork, he answered but said no English --> called on both numbers again with Cantonese  but he did not answer.  Also unsure if language is Cantonese or Mandarin but the patient did not respond to questions using either .  Cantonese: [id number], [id number].  Mandarin: [id number].    NKA per NH paperwork.    Triage VS: /63, HR 67, T 99.9F, SpO2 99% on RA.  Labs: WBC 12.52k (83.8% neutrophil), CMP with Na 175, Cl 142, BUN/Cr 91/2.4, AST/ALT 95/57, Mag 3.6, serum osm 396.  UA with mod blood (26 RBC), neg LE/nitrite (8 WBC), 21 casts, 4 epithelial cells.  Shant 50, UOsm 812.    CXR (pending official read) appears to have right sided basilar opacity.    ED interventions: 1L LR bolus, 1g ceftriaxone, 500mg azithromycin,  (20 Dec 2024 21:20)      Interval/Overnight Events      ROS  General: Denies fevers, chills, nightsweats, weight loss  HEENT: Denies headache, rhinorrhea, sore throat, eye pain  CV: Denies CP, palpitations  PULM: Denies SOB, cough  GI: Denies abdominal pain, diarrhea  : Denies dysuria, hematuria  MSK: Denies arthralgias  SKIN: Denies rash   NEURO: Denies paresthesias, weakness  PSYCH: Denies depression    MEDICATIONS  (STANDING):  aspirin  chewable 81 milliGRAM(s) Oral daily  chlorhexidine 2% Cloths 1 Application(s) Topical daily  finasteride 5 milliGRAM(s) Oral daily  heparin   Injectable 5000 Unit(s) SubCutaneous every 12 hours  QUEtiapine 12.5 milliGRAM(s) Oral two times a day    MEDICATIONS  (PRN):    ANTIBIOTICS:      VITALS:  T(F): 98.1, Max: 98.1 (01-01-25 @ 05:17)  HR: 68  BP: 123/61  RR: 18Vital Signs Last 24 Hrs  T(C): 36.7 (01 Jan 2025 05:17), Max: 36.7 (01 Jan 2025 05:17)  T(F): 98.1 (01 Jan 2025 05:17), Max: 98.1 (01 Jan 2025 05:17)  HR: 68 (01 Jan 2025 05:17) (66 - 78)  BP: 123/61 (01 Jan 2025 05:17) (123/61 - 128/57)  BP(mean): 84 (31 Dec 2024 21:05) (81 - 84)  RR: 18 (01 Jan 2025 05:17) (18 - 18)  SpO2: 98% (01 Jan 2025 05:17) (90% - 98%)    Parameters below as of 31 Dec 2024 21:05  Patient On (Oxygen Delivery Method): room air     I&O's Summary    PHYSICAL EXAM:  Gen: NAD, resting in bed  HEENT: Normocephalic, atraumatic  Neck: supple, no lymphadenopathy  CV: Regular rate & regular rhythm  Lungs: CTABL no wheeze  Abdomen: Soft, NTND+ BS present  Ext: Warm, well perfused no CCE  Neuro: non focal, awake, CN II-XII intact   Skin: no rash, no erythema  Psych: no SI, HI, Hallucination     LABS:                  MICROBIOLOGY:              IMAGING:  CXR      CT    CARDIOLOGY TESTING  QRS axis to [] ° and NSR at a rate of [] BPM. There was no atrial enlargement. There was no ventricular hypertrophy. There were no ST-T changes and all intervals were normal.    12 Lead ECG:   Ventricular Rate 51 BPM    Atrial Rate 51 BPM    P-R Interval 130 ms    QRS Duration 76 ms    Q-T Interval 486 ms    QTC Calculation(Bazett) 447 ms    P Axis 57 degrees    R Axis -21 degrees    T Axis 5 degrees    Diagnosis Line *** Poor data quality, interpretation may be adversely affected  Sinus bradycardia  Low voltage QRS  Nonspecific T wave abnormality  Abnormal ECG    Confirmed by Bridget Sethi MD ([id number]) on 12/24/2024 8:00:58 AM (12-23-24 @ 08:50)  12 Lead ECG:   Ventricular Rate 77 BPM    Atrial Rate 77 BPM    P-R Interval 140 ms    QRS Duration 72 ms    Q-T Interval 440 ms    QTC Calculation(Bazett) 497 ms    P Axis 61 degrees    R Axis 64 degrees    T Axis 61 degrees    Diagnosis Line Normal sinus rhythmwith sinus arrhythmia  Normal ECG    Confirmed by Kmai Maya ([id number]) on 12/22/2024 11:34:38 AM (12-21-24 @ 20:31)      ASSESSMENT/PLAN:  83 yo M with hx of HTN, CAD, BPH and Alzheimer dementia  presents from NH for hypernatremia.     #Hypernatremia 2/2 dehydration - improving  #Hypokalemia    #ANGEL 2/2 prerenal 2/2 poor PO intake - improving   - Na 175, Cr 2.4 on admission (baseline 1.2)  - Monitor BMP BID  - appreciate S&S eval, 1:1 feeding puree diet however patient is not eating   - no PEG per palliative discussion  - hospice unable to reach family - can follow up as outpatient    #RLL PNA possibly 2/2 aspiration  - CT shows Right lower lobe multifocal consolidative and nodular opacities. Additional smaller nodular opacities in all 5 lobes. Findings most likely infectious in nature. Correlate with symptoms. Given the indicated concern for malignancy, follow up in one month is recommended for resolution and re-evaluation  - BCx neg, RVP neg.  - s/p 7 day course of zosyn  - Aspiration precaution.   - S&S cs: pureed diet with ensure - however patient is not eating    #Hydrocele  - Scrotal swelling --> urology consulted to r/o inguinal hernia vs hydrocele  - Scrotal US done: Massive right hydrocele containing debris, total volume of 188 cc. Differential includes hemorrhage versus infection.  - Urology informed --> no acute intervention. FU OP    #Macrocytic anemia  - Hb 11.9, .6  - B12, folate unremarkable     #Bradycardia  - HR dropped 39-40s when asleep  - BP stable  - Monitor and EKG showed sinus bradycardia  - Improvement when awake, suspect degree of sleep apnea as well especially when HR dropping while sleeping  - TSH elevated, T4 wnl  - monitor     #HTN   - hold anti-HTN medication for now.     #Alzheimer dementia   - Hold donepezil for now.  - C/w Seroquel 12.5mg BID    #BPH   - c/w finasteride 5mg od       DVT ppx: heparin      #Handoff  - placement back to [facility] with palliative services .    #Supportive Management:  Dispo:  Home vs SNF  DVT Ppx: heparin   Injectable 5000 Unit(s) SubCutaneous every 12 hours  GI Ppx: Diet:  Diet, Pureed:   Supplement Feeding Modality:  Oral  Ensure Plus High Protein Cans or Servings Per Day:  1       Frequency:  Three Times a day (12-24-24 @ 17:19) [Active]      Total time spent to complete patient's bedside assessment, review medical chart, discuss medical plan of care with covering medical team was more than 45 minutes with >50% of time spent face to face with patient, discussion with patient/family and/or coordination of care    Housestaff's notes reviewed. When there is confusion regarding the content or information provided in the notes of a housestaff (resident, medical student, physician assistant, or nurse practioner) and the attending physician notes, the attending physician's note takes precedence and supersedes the other notes.    Efren Lester MD/Lulu  Attending Physician MEDICINE ATTENDING PROGRESS NOTE  83 yo M with hx of HTN, CAD, BPH and Alzheimer dementia  presents from NH for hypernatremia.     Interval/Overnight Events  - No acute complaints  - stable to dc to NH    ROS  General: Denies fevers, chills, nightsweats, weight loss  HEENT: Denies headache, rhinorrhea, sore throat, eye pain  CV: Denies CP, palpitations  PULM: Denies SOB, cough  GI: Denies abdominal pain, diarrhea  : Denies dysuria, hematuria  MSK: Denies arthralgias  SKIN: Denies rash   NEURO: Denies paresthesias, weakness  PSYCH: Denies depression    MEDICATIONS  (STANDING):  aspirin  chewable 81 milliGRAM(s) Oral daily  chlorhexidine 2% Cloths 1 Application(s) Topical daily  finasteride 5 milliGRAM(s) Oral daily  heparin   Injectable 5000 Unit(s) SubCutaneous every 12 hours  QUEtiapine 12.5 milliGRAM(s) Oral two times a day    MEDICATIONS  (PRN):    ANTIBIOTICS:      VITALS:  T(F): 98.1, Max: 98.1 (01-01-25 @ 05:17)  HR: 68  BP: 123/61  RR: 18Vital Signs Last 24 Hrs  T(C): 36.7 (01 Jan 2025 05:17), Max: 36.7 (01 Jan 2025 05:17)  T(F): 98.1 (01 Jan 2025 05:17), Max: 98.1 (01 Jan 2025 05:17)  HR: 68 (01 Jan 2025 05:17) (66 - 78)  BP: 123/61 (01 Jan 2025 05:17) (123/61 - 128/57)  BP(mean): 84 (31 Dec 2024 21:05) (81 - 84)  RR: 18 (01 Jan 2025 05:17) (18 - 18)  SpO2: 98% (01 Jan 2025 05:17) (90% - 98%)    Parameters below as of 31 Dec 2024 21:05  Patient On (Oxygen Delivery Method): room air     I&O's Summary    PHYSICAL EXAM:  Gen: NAD, resting in bed  HEENT: Normocephalic, atraumatic  Neck: supple, no lymphadenopathy  CV: Regular rate & regular rhythm  Lungs: CTABL no wheeze  Abdomen: Soft, NTND+ BS present  Ext: Warm, well perfused no CCE  Neuro: non focal, awake, CN II-XII intact   Skin: no rash, no erythema  Psych: no SI, HI, Hallucination     LABS:                  MICROBIOLOGY:              IMAGING:  CXR      CT    CARDIOLOGY TESTING  QRS axis to [] ° and NSR at a rate of [] BPM. There was no atrial enlargement. There was no ventricular hypertrophy. There were no ST-T changes and all intervals were normal.    12 Lead ECG:   Ventricular Rate 51 BPM    Atrial Rate 51 BPM    P-R Interval 130 ms    QRS Duration 76 ms    Q-T Interval 486 ms    QTC Calculation(Bazett) 447 ms    P Axis 57 degrees    R Axis -21 degrees    T Axis 5 degrees    Diagnosis Line *** Poor data quality, interpretation may be adversely affected  Sinus bradycardia  Low voltage QRS  Nonspecific T wave abnormality  Abnormal ECG    Confirmed by Bridget Sethi MD ([id number]) on 12/24/2024 8:00:58 AM (12-23-24 @ 08:50)  12 Lead ECG:   Ventricular Rate 77 BPM    Atrial Rate 77 BPM    P-R Interval 140 ms    QRS Duration 72 ms    Q-T Interval 440 ms    QTC Calculation(Bazett) 497 ms    P Axis 61 degrees    R Axis 64 degrees    T Axis 61 degrees    Diagnosis Line Normal sinus rhythmwith sinus arrhythmia  Normal ECG    Confirmed by Kami Maya ([id number]) on 12/22/2024 11:34:38 AM (12-21-24 @ 20:31)      ASSESSMENT/PLAN:  83 yo M with hx of HTN, CAD, BPH and Alzheimer dementia  presents from NH for hypernatremia.     #Hypernatremia 2/2 dehydration - improving  #Hypokalemia    #ANGEL 2/2 prerenal 2/2 poor PO intake - improving   - Na 175, Cr 2.4 on admission (baseline 1.2)  - Monitor BMP BID  - appreciate S&S eval, 1:1 feeding puree diet however patient is not eating   - no PEG per palliative discussion  - hospice unable to reach family - can follow up as outpatient    #RLL PNA possibly 2/2 aspiration  - CT shows Right lower lobe multifocal consolidative and nodular opacities. Additional smaller nodular opacities in all 5 lobes. Findings most likely infectious in nature. Correlate with symptoms. Given the indicated concern for malignancy, follow up in one month is recommended for resolution and re-evaluation  - BCx neg, RVP neg.  - s/p 7 day course of zosyn  - Aspiration precaution.   - S&S cs: pureed diet with ensure - however patient is not eating    #Hydrocele  - Scrotal swelling --> urology consulted to r/o inguinal hernia vs hydrocele  - Scrotal US done: Massive right hydrocele containing debris, total volume of 188 cc. Differential includes hemorrhage versus infection.  - Urology informed --> no acute intervention. FU OP    #Macrocytic anemia  - Hb 11.9, .6  - B12, folate unremarkable     #Bradycardia  - HR dropped 39-40s when asleep  - BP stable  - Monitor and EKG showed sinus bradycardia  - Improvement when awake, suspect degree of sleep apnea as well especially when HR dropping while sleeping  - TSH elevated, T4 wnl  - monitor     #HTN   - hold anti-HTN medication for now.     #Alzheimer dementia   - Hold donepezil for now.  - C/w Seroquel 12.5mg BID    #BPH   - c/w finasteride 5mg od    #Handoff  - placement back to [facility] with palliative services    #Supportive Management:  Dispo:  Home vs SNF  DVT Ppx: heparin   Injectable 5000 Unit(s) SubCutaneous every 12 hours  GI Ppx: Diet:  Diet, Pureed:   Supplement Feeding Modality:  Oral  Ensure Plus High Protein Cans or Servings Per Day:  1       Frequency:  Three Times a day (12-24-24 @ 17:19) [Active]      Total time spent to complete patient's bedside assessment, review medical chart, discuss medical plan of care with covering medical team was more than 45 minutes with >50% of time spent face to face with patient, discussion with patient/family and/or coordination of care    Housestaff's notes reviewed. When there is confusion regarding the content or information provided in the notes of a housestaff (resident, medical student, physician assistant, or nurse practioner) and the attending physician notes, the attending physician's note takes precedence and supersedes the other notes.    Efren Lester MD/Lulu  Attending Physician MEDICINE ATTENDING PROGRESS NOTE  85 yo M with hx of HTN, CAD, BPH and Alzheimer dementia  presents from NH for hypernatremia.     Interval/Overnight Events  - No acute complaints  - started D5 1/2 NS due to patient refuses to eat yesterday; he eats today  - stable to dc to NH    ROS  General: Denies fevers, chills, nightsweats, weight loss  HEENT: Denies headache, rhinorrhea, sore throat, eye pain  CV: Denies CP, palpitations  PULM: Denies SOB, cough  GI: Denies abdominal pain, diarrhea  : Denies dysuria, hematuria  MSK: Denies arthralgias  SKIN: Denies rash   NEURO: Denies paresthesias, weakness  PSYCH: Denies depression    MEDICATIONS  (STANDING):  aspirin  chewable 81 milliGRAM(s) Oral daily  chlorhexidine 2% Cloths 1 Application(s) Topical daily  finasteride 5 milliGRAM(s) Oral daily  heparin   Injectable 5000 Unit(s) SubCutaneous every 12 hours  QUEtiapine 12.5 milliGRAM(s) Oral two times a day    MEDICATIONS  (PRN):    ANTIBIOTICS:      VITALS:  T(F): 98.1, Max: 98.1 (01-01-25 @ 05:17)  HR: 68  BP: 123/61  RR: 18Vital Signs Last 24 Hrs  T(C): 36.7 (01 Jan 2025 05:17), Max: 36.7 (01 Jan 2025 05:17)  T(F): 98.1 (01 Jan 2025 05:17), Max: 98.1 (01 Jan 2025 05:17)  HR: 68 (01 Jan 2025 05:17) (66 - 78)  BP: 123/61 (01 Jan 2025 05:17) (123/61 - 128/57)  BP(mean): 84 (31 Dec 2024 21:05) (81 - 84)  RR: 18 (01 Jan 2025 05:17) (18 - 18)  SpO2: 98% (01 Jan 2025 05:17) (90% - 98%)    Parameters below as of 31 Dec 2024 21:05  Patient On (Oxygen Delivery Method): room air     I&O's Summary    PHYSICAL EXAM:  Gen: NAD, resting in bed  HEENT: Normocephalic, atraumatic  Neck: supple, no lymphadenopathy  CV: Regular rate & regular rhythm  Lungs: CTABL no wheeze  Abdomen: Soft, NTND+ BS present  Ext: Warm, well perfused no CCE  Neuro: non focal, awake, CN II-XII intact   Skin: no rash, no erythema  Psych: no SI, HI, Hallucination     LABS:                  MICROBIOLOGY:              IMAGING:  CXR      CT    CARDIOLOGY TESTING  QRS axis to [] ° and NSR at a rate of [] BPM. There was no atrial enlargement. There was no ventricular hypertrophy. There were no ST-T changes and all intervals were normal.    12 Lead ECG:   Ventricular Rate 51 BPM    Atrial Rate 51 BPM    P-R Interval 130 ms    QRS Duration 76 ms    Q-T Interval 486 ms    QTC Calculation(Bazett) 447 ms    P Axis 57 degrees    R Axis -21 degrees    T Axis 5 degrees    Diagnosis Line *** Poor data quality, interpretation may be adversely affected  Sinus bradycardia  Low voltage QRS  Nonspecific T wave abnormality  Abnormal ECG    Confirmed by Bridget Sethi MD ([id number]) on 12/24/2024 8:00:58 AM (12-23-24 @ 08:50)  12 Lead ECG:   Ventricular Rate 77 BPM    Atrial Rate 77 BPM    P-R Interval 140 ms    QRS Duration 72 ms    Q-T Interval 440 ms    QTC Calculation(Bazett) 497 ms    P Axis 61 degrees    R Axis 64 degrees    T Axis 61 degrees    Diagnosis Line Normal sinus rhythmwith sinus arrhythmia  Normal ECG    Confirmed by Kami Maya ([id number]) on 12/22/2024 11:34:38 AM (12-21-24 @ 20:31)      ASSESSMENT/PLAN:  85 yo M with hx of HTN, CAD, BPH and Alzheimer dementia  presents from NH for hypernatremia.     #Hypernatremia 2/2 dehydration - improving  #Hypokalemia    #ANGEL 2/2 prerenal 2/2 poor PO intake - improving   - Na 175, Cr 2.4 on admission (baseline 1.2)  - Monitor BMP BID  - appreciate S&S eval, 1:1 feeding puree diet however patient is not eating   - no PEG per palliative discussion  - hospice unable to reach family - can follow up as outpatient    #RLL PNA possibly 2/2 aspiration  - CT shows Right lower lobe multifocal consolidative and nodular opacities. Additional smaller nodular opacities in all 5 lobes. Findings most likely infectious in nature. Correlate with symptoms. Given the indicated concern for malignancy, follow up in one month is recommended for resolution and re-evaluation  - BCx neg, RVP neg.  - s/p 7 day course of zosyn  - Aspiration precaution.   - S&S cs: pureed diet with ensure - however patient is not eating    #Hydrocele  - Scrotal swelling --> urology consulted to r/o inguinal hernia vs hydrocele  - Scrotal US done: Massive right hydrocele containing debris, total volume of 188 cc. Differential includes hemorrhage versus infection.  - Urology informed --> no acute intervention. FU OP    #Macrocytic anemia  - Hb 11.9, .6  - B12, folate unremarkable     #Bradycardia  - HR dropped 39-40s when asleep  - BP stable  - Monitor and EKG showed sinus bradycardia  - Improvement when awake, suspect degree of sleep apnea as well especially when HR dropping while sleeping  - TSH elevated, T4 wnl  - monitor     #HTN   - hold anti-HTN medication for now.     #Alzheimer dementia   - Hold donepezil for now.  - C/w Seroquel 12.5mg BID    #BPH   - c/w finasteride 5mg od    #Handoff  - placement back to [facility] with palliative services    #Supportive Management:  Dispo:  Home vs SNF  DVT Ppx: heparin   Injectable 5000 Unit(s) SubCutaneous every 12 hours  GI Ppx: Diet:  Diet, Pureed:   Supplement Feeding Modality:  Oral  Ensure Plus High Protein Cans or Servings Per Day:  1       Frequency:  Three Times a day (12-24-24 @ 17:19) [Active]      Total time spent to complete patient's bedside assessment, review medical chart, discuss medical plan of care with covering medical team was more than 45 minutes with >50% of time spent face to face with patient, discussion with patient/family and/or coordination of care    Housestaff's notes reviewed. When there is confusion regarding the content or information provided in the notes of a housestaff (resident, medical student, physician assistant, or nurse practioner) and the attending physician notes, the attending physician's note takes precedence and supersedes the other notes.    Efren Lester MD/Lulu  Attending Physician

## 2025-01-02 LAB
ALBUMIN SERPL ELPH-MCNC: 2.2 G/DL — LOW (ref 3.5–5.2)
ALP SERPL-CCNC: 93 U/L — SIGNIFICANT CHANGE UP (ref 30–115)
ALT FLD-CCNC: 29 U/L — SIGNIFICANT CHANGE UP (ref 0–41)
ANION GAP SERPL CALC-SCNC: 11 MMOL/L — SIGNIFICANT CHANGE UP (ref 7–14)
AST SERPL-CCNC: 47 U/L — HIGH (ref 0–41)
BILIRUB SERPL-MCNC: 0.3 MG/DL — SIGNIFICANT CHANGE UP (ref 0.2–1.2)
BUN SERPL-MCNC: 9 MG/DL — LOW (ref 10–20)
CALCIUM SERPL-MCNC: 7.5 MG/DL — LOW (ref 8.4–10.5)
CHLORIDE SERPL-SCNC: 106 MMOL/L — SIGNIFICANT CHANGE UP (ref 98–110)
CO2 SERPL-SCNC: 25 MMOL/L — SIGNIFICANT CHANGE UP (ref 17–32)
CREAT SERPL-MCNC: 1.1 MG/DL — SIGNIFICANT CHANGE UP (ref 0.7–1.5)
EGFR: 66 ML/MIN/1.73M2 — SIGNIFICANT CHANGE UP
GLUCOSE BLDC GLUCOMTR-MCNC: 100 MG/DL — HIGH (ref 70–99)
GLUCOSE BLDC GLUCOMTR-MCNC: 101 MG/DL — HIGH (ref 70–99)
GLUCOSE BLDC GLUCOMTR-MCNC: 102 MG/DL — HIGH (ref 70–99)
GLUCOSE SERPL-MCNC: 91 MG/DL — SIGNIFICANT CHANGE UP (ref 70–99)
MAGNESIUM SERPL-MCNC: 1.9 MG/DL — SIGNIFICANT CHANGE UP (ref 1.8–2.4)
PHOSPHATE SERPL-MCNC: 2.7 MG/DL — SIGNIFICANT CHANGE UP (ref 2.1–4.9)
POTASSIUM SERPL-MCNC: 3.4 MMOL/L — LOW (ref 3.5–5)
POTASSIUM SERPL-SCNC: 3.4 MMOL/L — LOW (ref 3.5–5)
PROT SERPL-MCNC: 5.4 G/DL — LOW (ref 6–8)
SODIUM SERPL-SCNC: 142 MMOL/L — SIGNIFICANT CHANGE UP (ref 135–146)

## 2025-01-02 PROCEDURE — 99232 SBSQ HOSP IP/OBS MODERATE 35: CPT

## 2025-01-02 RX ADMIN — CHLORHEXIDINE GLUCONATE 1 APPLICATION(S): 1.2 RINSE ORAL at 11:09

## 2025-01-02 RX ADMIN — HEPARIN SODIUM 5000 UNIT(S): 1000 INJECTION, SOLUTION INTRAVENOUS; SUBCUTANEOUS at 05:21

## 2025-01-02 RX ADMIN — SODIUM CHLORIDE 60 MILLILITER(S): 9 INJECTION, SOLUTION INTRAVENOUS at 22:07

## 2025-01-02 RX ADMIN — SODIUM CHLORIDE 60 MILLILITER(S): 9 INJECTION, SOLUTION INTRAVENOUS at 06:10

## 2025-01-02 NOTE — PROGRESS NOTE ADULT - SUBJECTIVE AND OBJECTIVE BOX
CC: ANGEL (27 Dec 2024 11:27)    INTERVAL HPI/OVERNIGHT EVENTS:  Hospital day 13. Patient seen and examined at bedside. No overnight events. Not having good PO intake so D51/2NS started.       Vital Signs Last 24 Hrs  T(C): 36.8 (30 Dec 2024 05:15), Max: 37 (29 Dec 2024 14:17)  T(F): 98.3 (30 Dec 2024 05:15), Max: 98.6 (29 Dec 2024 14:17)  HR: 77 (30 Dec 2024 06:38) (62 - 86)  BP: 137/62 (30 Dec 2024 06:38) (127/59 - 155/73)  BP(mean): 78 (29 Dec 2024 14:17) (78 - 78)  RR: 18 (30 Dec 2024 05:15) (17 - 18)  SpO2: 95% (30 Dec 2024 05:15) (95% - 96%)    Parameters below as of 29 Dec 2024 20:05  Patient On (Oxygen Delivery Method): room air    PHYSICAL EXAM:  GENERAL: NAD, ill-appearing  HEAD:  Atraumatic, Normocephalic  EYES:   conjunctiva and sclera clear  NECK: Supple, No JVD  CHEST/LUNG: Clear to auscultation bilaterally; No wheeze  HEART: Regular rate and rhythm; No murmurs, rubs, or gallops  ABDOMEN: Soft, Nontender, Nondistended; Bowel sounds present  EXTREMITIES:  2+ Peripheral Pulses, No clubbing, cyanosis, or edema  SKIN: No rashes or lesions    I&O's Detail            29 Dec 2024 09:59    143    |  108    |  10     ----------------------------<  55     2.9     |  20     |  1.3      Ca    7.2        29 Dec 2024 09:59  Mg     2.0       29 Dec 2024 09:59        CAPILLARY BLOOD GLUCOSE          Urinalysis Basic - ( 29 Dec 2024 09:59 )    Color: x / Appearance: x / SG: x / pH: x  Gluc: 55 mg/dL / Ketone: x  / Bili: x / Urobili: x   Blood: x / Protein: x / Nitrite: x   Leuk Esterase: x / RBC: x / WBC x   Sq Epi: x / Non Sq Epi: x / Bacteria: x        MEDICATIONS  (STANDING):  aspirin  chewable 81 milliGRAM(s) Oral daily  chlorhexidine 2% Cloths 1 Application(s) Topical daily  finasteride 5 milliGRAM(s) Oral daily  heparin   Injectable 5000 Unit(s) SubCutaneous every 12 hours  QUEtiapine 12.5 milliGRAM(s) Oral two times a day    MEDICATIONS  (PRN):      RADIOLOGY & ADDITIONAL TESTS:

## 2025-01-02 NOTE — PROGRESS NOTE ADULT - ASSESSMENT
83 yo M with hx of HTN, CAD, BPH and Alzheimer dementia  presents from NH for hypernatremia.     #Hypernatremia 2/2 dehydration - resolved   #Hypokalemia - resolved   #ANGEL 2/2 prerenal 2/2 poor PO intake - resolved  - Na 175, Cr 2.4 on admission (baseline 1.2)  - s/p IVF   - appreciate nephro recs  - appreciate S&S eval, 1:1 feeding puree diet however patient is not eating   - started D51/2NS on 1/1/2025 for reduced PO intake, will encourage eating more  - no PEG per palliative discussion  - pending hospice referral     #RLL PNA possibly 2/2 aspiration - resolved   - CT shows Right lower lobe multifocal consolidative and nodular opacities. Additional smaller nodular opacities in all 5 lobes. Findings most likely infectious in nature. Correlate with symptoms. Given the indicated concern for malignancy, follow up in one month is recommended for resolution and re-evaluation  - BCx neg, RVP neg.  - s/p 7 day course of zosyn  - Aspiration precaution.   - S&S cs: pureed diet with ensure    #Hydrocele  - Scrotal swelling --> urology consulted to r/o inguinal hernia vs hydrocele  - Scrotal US done: Massive right hydrocele containing debris, total volume of 188 cc. Differential includes hemorrhage versus infection.  - Urology informed --> no acute intervention. FU OP    #Macrocytic anemia  - Hb 11.9, .6  -  B12, folate unremarkable     #Bradycardia  - HR dropped 39-40s when asleep  - BP stable  - Monitor and EKG showed sinus bradycardia  - Improvement when awake, suspect degree of sleep apnea as well especially when HR dropping while sleeping  - TSH elevated, T4 wnl  - monitor     #HTN   - hold anti-HTN medication for now.     #Alzheimer dementia   - Hold donepezil for now.  - C/w Seroquel 12.5mg BID    #BPH   - c/w finasteride 5mg od      #DVT ppx: heparin    Pending: CM reaching out to family for hospice referral

## 2025-01-02 NOTE — PROGRESS NOTE ADULT - SUBJECTIVE AND OBJECTIVE BOX
Nephrology progress note   alert but not eating well    ON events/Subjective:     Allergies:  No Known Allergies    Hospital Medications:   MEDICATIONS  (STANDING):  aspirin  chewable 81 milliGRAM(s) Oral daily  chlorhexidine 2% Cloths 1 Application(s) Topical daily  dextrose 5% + sodium chloride 0.45%. 1000 milliLiter(s) (60 mL/Hr) IV Continuous <Continuous>  finasteride 5 milliGRAM(s) Oral daily  heparin   Injectable 5000 Unit(s) SubCutaneous every 12 hours  QUEtiapine 12.5 milliGRAM(s) Oral two times a day    REVIEW OF SYSTEMS:  appears comfortable  All other review of systems is negative unless indicated above.    VITALS:  T(F): 97.4 (01-02-25 @ 12:35), Max: 98 (01-01-25 @ 20:15)  HR: 73 (01-02-25 @ 12:35)  BP: 132/60 (01-02-25 @ 12:35)  RR: 18 (01-02-25 @ 12:35)  SpO2: 100% (01-02-25 @ 12:35)  Wt(kg): --      PHYSICAL EXAM:  Constitutional: NAD  HEENT: anicteric sclera, oropharynx clear, MMM  Neck: No JVD  Respiratory: CTAB, no wheezes, rales or rhonchi  Cardiovascular: S1, S2, RRR  Gastrointestinal: BS+, soft, NT/ND  Extremities: No cyanosis or clubbing. No peripheral edema  Neurological: A/O x 3, no focal deficits  Psychiatric: Normal mood, normal affect  : No CVA tenderness. No nieto.   Skin: No rashes  Vascular Access:    LABS:  01-02    142  |  106  |  9[L]  ----------------------------<  91  3.4[L]   |  25  |  1.1    Ca    7.5[L]      02 Jan 2025 07:22  Phos  2.7     01-02  Mg     1.9     01-02    TPro  5.4[L]  /  Alb  2.2[L]  /  TBili  0.3  /  DBili      /  AST  47[H]  /  ALT  29  /  AlkPhos  93  01-02        Urine Studies:  Creatinine Trend: 1.1<--, 1.3<--, 1.5<--, 1.4<--, 1.4<--, 1.6<--  Urinalysis Basic - ( 02 Jan 2025 07:22 )    Color:  / Appearance:  / SG:  / pH:   Gluc: 91 mg/dL / Ketone:   / Bili:  / Urobili:    Blood:  / Protein:  / Nitrite:    Leuk Esterase:  / RBC:  / WBC    Sq Epi:  / Non Sq Epi:  / Bacteria:     admitted from NH with pneumonia and severe hypernatremia and ANGEL  ·hypernatremia likely from volume depletion from decreased po intake - is improving on ivf  ·ANGEL improved on ivf  ·hemodynamics stable  ·pneumonia (? aspiration ) s/p zosyn  hypokalemia    1) labs appear imprved and stable but still not eating well: continue D51/2NS at 60 cc/hr until palliative care decision finalaized

## 2025-01-03 PROCEDURE — 99231 SBSQ HOSP IP/OBS SF/LOW 25: CPT

## 2025-01-03 RX ADMIN — CHLORHEXIDINE GLUCONATE 1 APPLICATION(S): 1.2 RINSE ORAL at 11:25

## 2025-01-03 RX ADMIN — HEPARIN SODIUM 5000 UNIT(S): 1000 INJECTION, SOLUTION INTRAVENOUS; SUBCUTANEOUS at 05:35

## 2025-01-03 RX ADMIN — HEPARIN SODIUM 5000 UNIT(S): 1000 INJECTION, SOLUTION INTRAVENOUS; SUBCUTANEOUS at 17:29

## 2025-01-03 RX ADMIN — QUETIAPINE FUMARATE 12.5 MILLIGRAM(S): 100 TABLET, FILM COATED ORAL at 17:30

## 2025-01-03 RX ADMIN — Medication 81 MILLIGRAM(S): at 11:24

## 2025-01-03 RX ADMIN — SODIUM CHLORIDE 60 MILLILITER(S): 9 INJECTION, SOLUTION INTRAVENOUS at 11:25

## 2025-01-03 RX ADMIN — Medication 5 MILLIGRAM(S): at 11:23

## 2025-01-03 NOTE — DISCHARGE NOTE PROVIDER - NSDCMRMEDTOKEN_GEN_ALL_CORE_FT
acetaminophen 325 mg oral tablet: 1 tab(s) orally every 6 hours  aspirin 81 mg oral tablet, chewable: 1 tab(s) chewed once a day  Benicar 40 mg oral tablet: 1 tab(s) orally once a day  cholecalciferol 125 mcg (5000 intl units) oral tablet: 1 tab(s) orally once a week  donepezil 10 mg oral tablet: 1 tab(s) orally once a day (at bedtime)  finasteride 5 mg oral tablet: 1 tab(s) orally once a day  Nephplex Rx oral tablet: 1 tab(s) orally once a day  Seroquel 25 mg oral tablet: 0.5 tab(s) orally 2 times a day   aspirin 81 mg oral tablet, chewable: 1 tab(s) chewed once a day  cholecalciferol 125 mcg (5000 intl units) oral tablet: 1 tab(s) orally once a week  donepezil 10 mg oral tablet: 1 tab(s) orally once a day (at bedtime)  finasteride 5 mg oral tablet: 1 tab(s) orally once a day  Nephplex Rx oral tablet: 1 tab(s) orally once a day  Seroquel 25 mg oral tablet: 0.5 tab(s) orally 2 times a day

## 2025-01-03 NOTE — PROGRESS NOTE ADULT - SUBJECTIVE AND OBJECTIVE BOX
Nephrology progress note     more alert eating somewhat    ON events/Subjective:     Allergies:  No Known Allergies    Hospital Medications:   MEDICATIONS  (STANDING):  aspirin  chewable 81 milliGRAM(s) Oral daily  chlorhexidine 2% Cloths 1 Application(s) Topical daily  dextrose 5% + sodium chloride 0.45%. 1000 milliLiter(s) (60 mL/Hr) IV Continuous <Continuous>  finasteride 5 milliGRAM(s) Oral daily  heparin   Injectable 5000 Unit(s) SubCutaneous every 12 hours  QUEtiapine 12.5 milliGRAM(s) Oral two times a day    REVIEW OF SYSTEMS:  unable to obtain  All other review of systems is negative unless indicated above.    VITALS:  T(F): 97.5 (01-03-25 @ 08:48), Max: 97.5 (01-02-25 @ 20:18)  HR: 58 (01-03-25 @ 08:48)  BP: 161/65 (01-03-25 @ 05:12)  RR: 181 (01-03-25 @ 08:48)  SpO2: 99% (01-03-25 @ 05:12)  Wt(kg): --      PHYSICAL EXAM:  Constitutional: NAD  HEENT: anicteric sclera, oropharynx clear, MMM  Neck: No JVD  Respiratory: CTAB, no wheezes, rales or rhonchi  Cardiovascular: S1, S2, RRR  Gastrointestinal: BS+, soft, NT/ND  Extremities: No cyanosis or clubbing. No peripheral edema  Neurological: A/O x 3, no focal deficits  Psychiatric: Normal mood, normal affect  : No CVA tenderness. No nieto.   Skin: No rashes  Vascular Access:    LABS:  01-02    142  |  106  |  9[L]  ----------------------------<  91  3.4[L]   |  25  |  1.1    Ca    7.5[L]      02 Jan 2025 07:22  Phos  2.7     01-02  Mg     1.9     01-02    TPro  5.4[L]  /  Alb  2.2[L]  /  TBili  0.3  /  DBili      /  AST  47[H]  /  ALT  29  /  AlkPhos  93  01-02        Urine Studies:  Creatinine Trend: 1.1<--, 1.3<--, 1.5<--, 1.4<--, 1.4<--, 1.6<--  Urinalysis Basic - ( 02 Jan 2025 07:22 )    Color:  / Appearance:  / SG:  / pH:   Gluc: 91 mg/dL / Ketone:   / Bili:  / Urobili:    Blood:  / Protein:  / Nitrite:    Leuk Esterase:  / RBC:  / WBC    Sq Epi:  / Non Sq Epi:  / Bacteria:     admitted from NH with pneumonia and severe hypernatremia and ANGEL  ·hypernatremia likely from volume depletion from decreased po intake - is improving on ivf  ·ANGEL improved on ivf  ·hemodynamics stable  ·pneumonia (? aspiration ) s/p zosyn  hypokalemia    1) labs appear improved and stable but still not eating well: continue D51/2NS at 60 cc/hr until palliative care decision finalaized  labs as possible

## 2025-01-03 NOTE — DISCHARGE NOTE PROVIDER - NSDCCPCAREPLAN_GEN_ALL_CORE_FT
PRINCIPAL DISCHARGE DIAGNOSIS  Diagnosis: Hypernatremia  Assessment and Plan of Treatment: You came in for hypernatremia with sodium levels of 175, secondary to dehydration and poor oral intake. You was given IV fluids which helped lower your sodium levels. Speech and swallow team saw you and cleared you for a pureed diet as well. We encourgaed you to eat more, but you are still not eating enough. A possibility of PEG tube, which is an artificial feeding tube, is something you and your family did not want. You was started on IV fluids to maintain your sugar levels. You will be discharged back to nursing home under palliative services. Please try to encourgae more PO intake and fluid intake as well.      SECONDARY DISCHARGE DIAGNOSES  Diagnosis: Opacity of lung on imaging study  Assessment and Plan of Treatment:

## 2025-01-03 NOTE — PROGRESS NOTE ADULT - SUBJECTIVE AND OBJECTIVE BOX
Pt seen and examined at bedside.          VITAL SIGNS (Last 24 hrs):  T(C): 36.3 (01-03-25 @ 14:04), Max: 36.4 (01-02-25 @ 20:18)  HR: 64 (01-03-25 @ 14:04) (54 - 64)  BP: 122/69 (01-03-25 @ 14:04) (122/69 - 161/65)  RR: 18 (01-03-25 @ 14:04) (18 - 181)  SpO2: 100% (01-03-25 @ 14:04) (96% - 100%)            I&O's Summary      PHYSICAL EXAM:  GENERAL: NAD   HEAD:  Atraumatic, Normocephalic  EYES:  conjunctiva and sclera clear  NECK: Supple, No JVD  CHEST/LUNG: Clear to auscultation bilaterally; No wheeze  HEART: Regular rate and rhythm; No murmurs, rubs, or gallops  ABDOMEN: Soft, Nontender, Nondistended; Bowel sounds present  EXTREMITIES:  2+ Peripheral Pulses, No clubbing, cyanosis, or edema  SKIN: No rashes or lesions        Labs Reviewed         BMP:    01-02 @ 07:22    Blood Urea Nitrogen - 9  Calcium - 7.5  Carbond Dioxide - 25  Chloride - 106  Creatinine - 1.1  Glucose - 91  Potassium - 3.4  Sodium - 142             MEDICATIONS  (STANDING):  aspirin  chewable 81 milliGRAM(s) Oral daily  chlorhexidine 2% Cloths 1 Application(s) Topical daily  dextrose 5% + sodium chloride 0.45%. 1000 milliLiter(s) (60 mL/Hr) IV Continuous <Continuous>  finasteride 5 milliGRAM(s) Oral daily  heparin   Injectable 5000 Unit(s) SubCutaneous every 12 hours  QUEtiapine 12.5 milliGRAM(s) Oral two times a day    MEDICATIONS  (PRN):

## 2025-01-03 NOTE — DISCHARGE NOTE PROVIDER - HOSPITAL COURSE
H&P:  84-year-old male with past medical history of HTN, BPH, CAD, vitamin D def, and Alzheimer's dementia presents to the ED from [facility] for abnormal lab result of Na 177. The patient is awake and active but non-verbal.  Called son on both the numbers provided on NH paperwork, he answered but said no English --> called on both numbers again with Cantonese  but he did not answer.  Also unsure if language is Cantonese or Mandarin but the patient did not respond to questions using either .  Cantonese: [id number], [id number].  Mandarin: [id number].    NKA per NH paperwork.    Triage VS: /63, HR 67, T 99.9F, SpO2 99% on RA.  Labs: WBC 12.52k (83.8% neutrophil), CMP with Na 175, Cl 142, BUN/Cr 91/2.4, AST/ALT 95/57, Mag 3.6, serum osm 396.  UA with mod blood (26 RBC), neg LE/nitrite (8 WBC), 21 casts, 4 epithelial cells.  Shant 50, UOsm 812.    CXR (pending official read) appears to have right sided basilar opacity.    ED interventions: 1L LR bolus, 1g ceftriaxone, 500mg azithromycin,     HOSPITAL COURSE:  85 yo M with hx of HTN, CAD, BPH and Alzheimer dementia  presents from NH for hypernatremia.     #Hypernatremia 2/2 dehydration - resolved   #Hypokalemia - resolved   #ANGEL 2/2 prerenal 2/2 poor PO intake - resolved  - Na 175, Cr 2.4 on admission (baseline 1.2)  - s/p IVF   - appreciate nephro recs  - appreciate S&S eval, 1:1 feeding puree diet however patient is not eating   - started D51/2NS on 1/1/2025 for reduced PO intake, will encourage eating more  - no PEG per palliative discussion, DNR/DNI as per palliative      #RLL PNA possibly 2/2 aspiration - resolved   - CT shows Right lower lobe multifocal consolidative and nodular opacities. Additional smaller nodular opacities in all 5 lobes. Findings most likely infectious in nature. Correlate with symptoms. Given the indicated concern for malignancy, follow up in one month is recommended for resolution and re-evaluation  - BCx neg, RVP neg.  - s/p 7 day course of zosyn  - Aspiration precaution.   - S&S cs: pureed diet with ensure    #Hydrocele  - Scrotal swelling --> urology consulted to r/o inguinal hernia vs hydrocele  - Scrotal US done: Massive right hydrocele containing debris, total volume of 188 cc. Differential includes hemorrhage versus infection.  - Urology informed --> no acute intervention. FU OP    #Macrocytic anemia  - Hb 11.9, .6  -  B12, folate unremarkable     #Bradycardia  - HR dropped 39-40s when asleep  - BP stable  - Monitor and EKG showed sinus bradycardia  - Improvement when awake, suspect degree of sleep apnea as well especially when HR dropping while sleeping  - TSH elevated, T4 wnl  - monitor     #HTN   - hold anti-HTN medication for now.     #Alzheimer dementia   - Hold donepezil for now.  - C/w Seroquel 12.5mg BID    #BPH   - c/w finasteride 5mg od    Patient is made DNR/DNI/no PEG as per palliative. Patient will be discharged back to NH with palliative.   Plans of DC, including treatment plan, medication reconciliation, and diagnoses, have been discussed with Dr. Soriano on 01/03/2025 and discharge was approved. H&P:  84-year-old male with past medical history of HTN, BPH, CAD, vitamin D def, and Alzheimer's dementia presents to the ED from [facility] for abnormal lab result of Na 177. The patient is awake and active but non-verbal.  Called son on both the numbers provided on NH paperwork, he answered but said no English --> called on both numbers again with Cantonese  but he did not answer.  Also unsure if language is Cantonese or Mandarin but the patient did not respond to questions using either .  Cantonese: [id number], [id number].  Mandarin: [id number].    NKA per NH paperwork.    Triage VS: /63, HR 67, T 99.9F, SpO2 99% on RA.  Labs: WBC 12.52k (83.8% neutrophil), CMP with Na 175, Cl 142, BUN/Cr 91/2.4, AST/ALT 95/57, Mag 3.6, serum osm 396.  UA with mod blood (26 RBC), neg LE/nitrite (8 WBC), 21 casts, 4 epithelial cells.  Shant 50, UOsm 812.    CXR (pending official read) appears to have right sided basilar opacity.    ED interventions: 1L LR bolus, 1g ceftriaxone, 500mg azithromycin,     HOSPITAL COURSE:  85 yo M with hx of HTN, CAD, BPH and Alzheimer dementia  presents from NH for hypernatremia.     #Hypernatremia 2/2 dehydration - resolved   #Hypokalemia - resolved   #ANGEL 2/2 prerenal 2/2 poor PO intake - resolved  - Na 175, Cr 2.4 on admission (baseline 1.2)  - s/p IVF   - appreciate nephro recs  - appreciate S&S eval, 1:1 feeding puree diet however patient is not eating   - started D51/2NS on 1/1/2025 for reduced PO intake, will encourage eating more  - no PEG per palliative discussion, DNR/DNI as per palliative      #RLL PNA possibly 2/2 aspiration - resolved   - CT shows Right lower lobe multifocal consolidative and nodular opacities. Additional smaller nodular opacities in all 5 lobes. Findings most likely infectious in nature. Correlate with symptoms. Given the indicated concern for malignancy, follow up in one month is recommended for resolution and re-evaluation  - BCx neg, RVP neg.  - s/p 7 day course of zosyn  - Aspiration precaution.   - S&S cs: pureed diet with ensure    #Hydrocele  - Scrotal swelling --> urology consulted to r/o inguinal hernia vs hydrocele  - Scrotal US done: Massive right hydrocele containing debris, total volume of 188 cc. Differential includes hemorrhage versus infection.  - Urology informed --> no acute intervention. FU OP    #Macrocytic anemia  - Hb 11.9, .6  -  B12, folate unremarkable     #Bradycardia  - HR dropped 39-40s when asleep  - BP stable  - Monitor and EKG showed sinus bradycardia  - Improvement when awake, suspect degree of sleep apnea as well especially when HR dropping while sleeping  - TSH elevated, T4 wnl  - monitor     #HTN   - hold anti-HTN medication for now.     #Alzheimer dementia   - Hold donepezil for now.  - C/w Seroquel 12.5mg BID    #BPH   - c/w finasteride 5mg od    Patient is made DNR/DNI/no PEG as per palliative. Patient will be discharged back to NH with palliative.   Plans of DC, including treatment plan, medication reconciliation, and diagnoses, have been discussed with Dr. Mcpherson on 01/03/2025 and discharge was approved.

## 2025-01-03 NOTE — PROGRESS NOTE ADULT - ASSESSMENT
83 yo M with hx of HTN, CAD, BPH and Alzheimer dementia  presents from NH for hypernatremia.         #Hypernatremia 2/2 dehydration - improving  #Hypokalemia    #ANGEL 2/2 prerenal 2/2 poor PO intake - improving   - Na 175, Cr 2.4 on admission (baseline 1.2)  - Monitor BMP BID  - appreciate S&S eval, 1:1 feeding puree diet however patient is not eating   - no PEG per palliative discussion  - hospice unable to reach family - can follow up as outpatient    #RLL PNA possibly 2/2 aspiration  - CT shows Right lower lobe multifocal consolidative and nodular opacities. Additional smaller nodular opacities in all 5 lobes. Findings most likely infectious in nature. Correlate with symptoms. Given the indicated concern for malignancy, follow up in one month is recommended for resolution and re-evaluation  - BCx neg, RVP neg.  - s/p 7 day course of zosyn  - Aspiration precaution.   - S&S cs: pureed diet with ensure - however patient is not eating    #Hydrocele  - Scrotal swelling --> urology consulted to r/o inguinal hernia vs hydrocele  - Scrotal US done: Massive right hydrocele containing debris, total volume of 188 cc. Differential includes hemorrhage versus infection.  - Urology informed --> no acute intervention. FU OP    #Macrocytic anemia  - Hb 11.9, .6  - B12, folate unremarkable     #Bradycardia  - HR dropped 39-40s when asleep  - BP stable  - Monitor and EKG showed sinus bradycardia  - Improvement when awake, suspect degree of sleep apnea as well especially when HR dropping while sleeping  - TSH elevated, T4 wnl  - monitor     #HTN   - hold anti-HTN medication for now.     #Alzheimer dementia   - Hold donepezil for now.  - C/w Seroquel 12.5mg BID    #BPH   - c/w finasteride 5mg od       DVT ppx: heparin      #Handoff  - placement back to [facility] with palliative services.

## 2025-01-03 NOTE — PROGRESS NOTE ADULT - REASON FOR ADMISSION
ANGEL
hypernatremia
hypernatremia and ANGEL
ANGEL
ANGEL, hypernatremia
ESRD
pneumonia

## 2025-01-04 PROCEDURE — 99231 SBSQ HOSP IP/OBS SF/LOW 25: CPT

## 2025-01-04 RX ORDER — QUETIAPINE FUMARATE 100 MG/1
25 TABLET, FILM COATED ORAL THREE TIMES A DAY
Refills: 0 | Status: DISCONTINUED | OUTPATIENT
Start: 2025-01-04 | End: 2025-01-05

## 2025-01-04 RX ADMIN — HEPARIN SODIUM 5000 UNIT(S): 1000 INJECTION, SOLUTION INTRAVENOUS; SUBCUTANEOUS at 17:04

## 2025-01-04 RX ADMIN — QUETIAPINE FUMARATE 25 MILLIGRAM(S): 100 TABLET, FILM COATED ORAL at 13:25

## 2025-01-04 RX ADMIN — QUETIAPINE FUMARATE 25 MILLIGRAM(S): 100 TABLET, FILM COATED ORAL at 21:52

## 2025-01-04 RX ADMIN — QUETIAPINE FUMARATE 25 MILLIGRAM(S): 100 TABLET, FILM COATED ORAL at 09:35

## 2025-01-04 RX ADMIN — QUETIAPINE FUMARATE 12.5 MILLIGRAM(S): 100 TABLET, FILM COATED ORAL at 05:17

## 2025-01-04 RX ADMIN — Medication 81 MILLIGRAM(S): at 11:23

## 2025-01-04 RX ADMIN — SODIUM CHLORIDE 60 MILLILITER(S): 9 INJECTION, SOLUTION INTRAVENOUS at 11:55

## 2025-01-04 RX ADMIN — CHLORHEXIDINE GLUCONATE 1 APPLICATION(S): 1.2 RINSE ORAL at 12:59

## 2025-01-04 RX ADMIN — HEPARIN SODIUM 5000 UNIT(S): 1000 INJECTION, SOLUTION INTRAVENOUS; SUBCUTANEOUS at 05:21

## 2025-01-04 RX ADMIN — Medication 5 MILLIGRAM(S): at 11:23

## 2025-01-04 NOTE — PROGRESS NOTE ADULT - SUBJECTIVE AND OBJECTIVE BOX
Pt seen and examined at bedside.        VITAL SIGNS (Last 24 hrs):  T(C): 36.4 (01-04-25 @ 13:25), Max: 36.8 (01-04-25 @ 05:13)  HR: 79 (01-04-25 @ 13:25) (61 - 79)  BP: 117/61 (01-04-25 @ 13:25) (117/61 - 134/62)  RR: 18 (01-04-25 @ 13:25) (18 - 18)  SpO2: 98% (01-04-25 @ 13:25) (96% - 99%)          I&O's Summary      PHYSICAL EXAM:  GENERAL: NAD   EYES:   conjunctiva and sclera clear  NECK: Supple, No JVD  CHEST/LUNG: Clear to auscultation bilaterally; No wheeze  HEART: Regular rate and rhythm; No murmurs, rubs, or gallops  ABDOMEN: Soft, Nontender, Nondistended; Bowel sounds present  EXTREMITIES:  2+ Peripheral Pulses, No clubbing, cyanosis, or edema  SKIN: No rashes or lesions    Labs Reviewed         BMP:    01-02 @ 07:22    Blood Urea Nitrogen - 9  Calcium - 7.5  Carbond Dioxide - 25  Chloride - 106  Creatinine - 1.1  Glucose - 91  Potassium - 3.4  Sodium - 142      Hemoglobin A1c -     Urine Culture:            MEDICATIONS  (STANDING):  aspirin  chewable 81 milliGRAM(s) Oral daily  chlorhexidine 2% Cloths 1 Application(s) Topical daily  finasteride 5 milliGRAM(s) Oral daily  heparin   Injectable 5000 Unit(s) SubCutaneous every 12 hours  QUEtiapine 25 milliGRAM(s) Oral three times a day    MEDICATIONS  (PRN):

## 2025-01-04 NOTE — PROGRESS NOTE ADULT - ASSESSMENT
dehydration  hypernatremia  ANGEL  PNA    plan:    ate well, tolerating PO   dc IVF  trend labs  d/w nursing  dnr / dni

## 2025-01-04 NOTE — PROGRESS NOTE ADULT - ASSESSMENT
83 yo M with hx of HTN, CAD, BPH and Alzheimer dementia  presents from NH for hypernatremia.         #Hypernatremia 2/2 dehydration - improving  #Hypokalemia    #ANGEL 2/2 prerenal 2/2 poor PO intake - improving   - Na 175, Cr 2.4 on admission (baseline 1.2)  - appreciate S&S eval, 1:1 feeding puree diet however patient is not eating   - no PEG per palliative discussion  - hospice unable to reach family - can follow up as outpatient    #RLL PNA possibly 2/2 aspiration  - CT shows Right lower lobe multifocal consolidative and nodular opacities. Additional smaller nodular opacities in all 5 lobes. Findings most likely infectious in nature. Correlate with symptoms. Given the indicated concern for malignancy, follow up in one month is recommended for resolution and re-evaluation  - BCx neg, RVP neg.  - s/p 7 day course of zosyn  - Aspiration precaution.   - S&S cs: pureed diet with ensure - however patient is not eating    #Hydrocele  - Scrotal swelling --> urology consulted to r/o inguinal hernia vs hydrocele  - Scrotal US done: Massive right hydrocele containing debris, total volume of 188 cc. Differential includes hemorrhage versus infection.  - Urology informed --> no acute intervention. FU OP    #Macrocytic anemia  - Hb 11.9, .6  - B12, folate unremarkable     #Bradycardia  - HR dropped 39-40s when asleep  - BP stable  - Monitor and EKG showed sinus bradycardia  - Improvement when awake, suspect degree of sleep apnea as well especially when HR dropping while sleeping  - TSH elevated, T4 wnl  - monitor     #HTN   - hold anti-HTN medication for now.     #Alzheimer dementia   - Hold donepezil for now.  - C/w Seroquel      #BPH   - c/w finasteride 5mg od       DVT ppx: heparin      #Handoff  - placement back to [facility] with palliative services.

## 2025-01-04 NOTE — PROGRESS NOTE ADULT - SUBJECTIVE AND OBJECTIVE BOX
NEPHROLOGY FOLLOW UP NOTE    tolerating PO  on ivf     PAST MEDICAL & SURGICAL HISTORY:  CAD (coronary artery disease)      Central pain syndrome      BPH (benign prostatic hyperplasia)      Benign essential HTN      Vitamin D deficiency      Alzheimer disease        Allergies:  No Known Allergies    Home Medications Reviewed    SOCIAL HISTORY:  Denies ETOH,Smoking,   FAMILY HISTORY:    DNI: Trial NIV        REVIEW OF SYSTEMS:  All other review of systems is negative unless indicated above.    PHYSICAL EXAM:  Constitutional: NAD, frail  HEENT: anicteric sclera, oropharynx clear, MMM  Neck: No JVD  Respiratory: CTAB, no wheezes, rales or rhonchi  Cardiovascular: S1, S2, RRR  Gastrointestinal: BS+, soft, NT/ND  Extremities: No cyanosis or clubbing. No peripheral edema  Neurological: awakens  : No CVA tenderness.   Skin: No rashes     Hospital Medications:   MEDICATIONS  (STANDING):  aspirin  chewable 81 milliGRAM(s) Oral daily  chlorhexidine 2% Cloths 1 Application(s) Topical daily  finasteride 5 milliGRAM(s) Oral daily  heparin   Injectable 5000 Unit(s) SubCutaneous every 12 hours  QUEtiapine 25 milliGRAM(s) Oral three times a day        VITALS:  T(F): 97.6 (01-04-25 @ 13:25), Max: 98.2 (01-04-25 @ 05:13)  HR: 79 (01-04-25 @ 13:25)  BP: 117/61 (01-04-25 @ 13:25)  RR: 18 (01-04-25 @ 13:25)  SpO2: 98% (01-04-25 @ 13:25)  Wt(kg): --        LABS:            Urine Studies:  Urinalysis Basic - ( 02 Jan 2025 07:22 )    Color:  / Appearance:  / SG:  / pH:   Gluc: 91 mg/dL / Ketone:   / Bili:  / Urobili:    Blood:  / Protein:  / Nitrite:    Leuk Esterase:  / RBC:  / WBC    Sq Epi:  / Non Sq Epi:  / Bacteria:           RADIOLOGY & ADDITIONAL STUDIES:

## 2025-01-05 VITALS
OXYGEN SATURATION: 98 % | SYSTOLIC BLOOD PRESSURE: 128 MMHG | DIASTOLIC BLOOD PRESSURE: 64 MMHG | HEART RATE: 69 BPM | RESPIRATION RATE: 18 BRPM | TEMPERATURE: 98 F

## 2025-01-05 LAB
GLUCOSE BLDC GLUCOMTR-MCNC: 100 MG/DL — HIGH (ref 70–99)
GLUCOSE BLDC GLUCOMTR-MCNC: 83 MG/DL — SIGNIFICANT CHANGE UP (ref 70–99)
GLUCOSE BLDC GLUCOMTR-MCNC: 86 MG/DL — SIGNIFICANT CHANGE UP (ref 70–99)

## 2025-01-05 PROCEDURE — 99238 HOSP IP/OBS DSCHRG MGMT 30/<: CPT

## 2025-01-05 RX ORDER — OLMESARTAN MEDOXOMIL 40 MG/1
1 TABLET, FILM COATED ORAL
Refills: 0 | DISCHARGE

## 2025-01-05 RX ORDER — ACETAMINOPHEN 80 MG/.8ML
1 SOLUTION/ DROPS ORAL
Refills: 0 | DISCHARGE

## 2025-01-05 RX ADMIN — Medication 5 MILLIGRAM(S): at 13:12

## 2025-01-05 RX ADMIN — QUETIAPINE FUMARATE 25 MILLIGRAM(S): 100 TABLET, FILM COATED ORAL at 06:01

## 2025-01-05 RX ADMIN — Medication 81 MILLIGRAM(S): at 13:12

## 2025-01-05 RX ADMIN — CHLORHEXIDINE GLUCONATE 1 APPLICATION(S): 1.2 RINSE ORAL at 13:19

## 2025-01-05 RX ADMIN — HEPARIN SODIUM 5000 UNIT(S): 1000 INJECTION, SOLUTION INTRAVENOUS; SUBCUTANEOUS at 06:02

## 2025-01-05 NOTE — PROGRESS NOTE ADULT - SUBJECTIVE AND OBJECTIVE BOX
NEPHROLOGY FOLLOW UP NOTE    off IVF  resting comfortably    PAST MEDICAL & SURGICAL HISTORY:  CAD (coronary artery disease)      Central pain syndrome      BPH (benign prostatic hyperplasia)      Benign essential HTN      Vitamin D deficiency      Alzheimer disease        Allergies:  No Known Allergies    Home Medications Reviewed    SOCIAL HISTORY:  Denies ETOH,Smoking,   FAMILY HISTORY:    DNI: Trial NIV        REVIEW OF SYSTEMS:  All other review of systems is negative unless indicated above.    PHYSICAL EXAM:  Constitutional: NAD, frail  HEENT: anicteric sclera, oropharynx clear, MMM  Neck: No JVD  Respiratory: CTAB, no wheezes, rales or rhonchi  Cardiovascular: S1, S2, RRR  Gastrointestinal: BS+, soft, NT/ND  Extremities: No cyanosis or clubbing. No peripheral edema  Neurological: awakens  : No CVA tenderness.   Skin: No rashes     Hospital Medications:   MEDICATIONS  (STANDING):  aspirin  chewable 81 milliGRAM(s) Oral daily  chlorhexidine 2% Cloths 1 Application(s) Topical daily  finasteride 5 milliGRAM(s) Oral daily  heparin   Injectable 5000 Unit(s) SubCutaneous every 12 hours  QUEtiapine 25 milliGRAM(s) Oral three times a day        VITALS:  T(F): 97.5 (01-05-25 @ 04:39), Max: 97.7 (01-04-25 @ 19:56)  HR: 70 (01-05-25 @ 04:39)  BP: 162/69 (01-05-25 @ 04:39)  RR: 18 (01-05-25 @ 04:39)  SpO2: 100% (01-05-25 @ 04:39)  Wt(kg): --        LABS:            Urine Studies:  Urinalysis Basic - ( 02 Jan 2025 07:22 )    Color:  / Appearance:  / SG:  / pH:   Gluc: 91 mg/dL / Ketone:   / Bili:  / Urobili:    Blood:  / Protein:  / Nitrite:    Leuk Esterase:  / RBC:  / WBC    Sq Epi:  / Non Sq Epi:  / Bacteria:           RADIOLOGY & ADDITIONAL STUDIES:

## 2025-01-05 NOTE — PROGRESS NOTE ADULT - ASSESSMENT
85 yo M with hx of HTN, CAD, BPH and Alzheimer dementia  presents from NH for hypernatremia.         #Hypernatremia 2/2 dehydration - improving  #Hypokalemia    #ANGEL 2/2 prerenal 2/2 poor PO intake - improving   - Na 175, Cr 2.4 on admission (baseline 1.2)  - appreciate S&S eval, 1:1 feeding puree diet however patient is not eating   - no PEG per palliative discussion  - hospice unable to reach family - can follow up as outpatient    #RLL PNA possibly 2/2 aspiration  - CT shows Right lower lobe multifocal consolidative and nodular opacities. Additional smaller nodular opacities in all 5 lobes. Findings most likely infectious in nature. Correlate with symptoms. Given the indicated concern for malignancy, follow up in one month is recommended for resolution and re-evaluation  - BCx neg, RVP neg.  - s/p 7 day course of zosyn  - Aspiration precaution.   - S&S cs: pureed diet with ensure - however patient is not eating    #Hydrocele  - Scrotal swelling --> urology consulted to r/o inguinal hernia vs hydrocele  - Scrotal US done: Massive right hydrocele containing debris, total volume of 188 cc. Differential includes hemorrhage versus infection.  - Urology informed --> no acute intervention. FU OP    #Macrocytic anemia  - B12, folate unremarkable     #Bradycardia  - HR dropped 39-40s when asleep  - BP stable  - Monitor and EKG showed sinus bradycardia  - Improvement when awake, suspect degree of sleep apnea as well especially when HR dropping while sleeping  - TSH elevated, T4 wnl  - monitor     #HTN   - hold anti-HTN medication for now.     #Alzheimer dementia   - Hold donepezil for now.  - C/w Seroquel      #BPH   - c/w finasteride 5mg od       DVT ppx: heparin      #Handoff  - [facility].

## 2025-01-05 NOTE — DISCHARGE NOTE NURSING/CASE MANAGEMENT/SOCIAL WORK - PATIENT PORTAL LINK FT
You can access the FollowMyHealth Patient Portal offered by [facility] by registering at the following website: [email or web address]. By joining [employer]’s FollowMyHealth portal, you will also be able to view your health information using other applications (apps) compatible with our system.

## 2025-01-05 NOTE — DISCHARGE NOTE NURSING/CASE MANAGEMENT/SOCIAL WORK - NSDCPEFALRISK_GEN_ALL_CORE
For information on Fall & Injury Prevention, visit: [email or web address] OR  [email or web address] OR  https://www.cdc.gov/steadi/patient.html

## 2025-01-05 NOTE — DISCHARGE NOTE NURSING/CASE MANAGEMENT/SOCIAL WORK - FINANCIAL ASSISTANCE
[facility] provides services at a reduced cost to those who are determined to be eligible through [facility]’s financial assistance program. Information regarding [facility]’s financial assistance program can be found by going to [email or web address] or by calling [phone].

## 2025-01-05 NOTE — PROGRESS NOTE ADULT - PROVIDER SPECIALTY LIST ADULT
Hospitalist
Internal Medicine
Nephrology
Urology
Hospitalist
Hospitalist
Internal Medicine
Nephrology
Critical Care
Internal Medicine
Nephrology
Critical Care
Internal Medicine

## 2025-01-05 NOTE — PROGRESS NOTE ADULT - SUBJECTIVE AND OBJECTIVE BOX
Pt seen and examined at bedside. Continues to have poor PO intake.         VITAL SIGNS (Last 24 hrs):  T(C): 36.5 (01-05-25 @ 12:23), Max: 36.5 (01-04-25 @ 19:56)  HR: 69 (01-05-25 @ 12:23) (69 - 79)  BP: 128/64 (01-05-25 @ 12:23) (117/61 - 162/69)  RR: 18 (01-05-25 @ 12:23) (18 - 18)  SpO2: 98% (01-05-25 @ 12:23) (98% - 100%)  Wt(kg): --  Daily     Daily     I&O's Summary      PHYSICAL EXAM:  GENERAL: NAD   HEAD:  Atraumatic, Normocephalic  EYES:  conjunctiva and sclera clear  NECK: Supple, No JVD  CHEST/LUNG: Clear to auscultation bilaterally; No wheeze  HEART: Regular rate and rhythm; No murmurs, rubs, or gallops  ABDOMEN: Soft, Nontender, Nondistended; Bowel sounds present  EXTREMITIES:  2+ Peripheral Pulses, No clubbing, cyanosis, or edema  SKIN: No rashes or lesions    Labs Reviewed         BMP:    01-02 @ 07:22    Blood Urea Nitrogen - 9  Calcium - 7.5  Carbond Dioxide - 25  Chloride - 106  Creatinine - 1.1  Glucose - 91  Potassium - 3.4  Sodium - 142             MEDICATIONS  (STANDING):  aspirin  chewable 81 milliGRAM(s) Oral daily  chlorhexidine 2% Cloths 1 Application(s) Topical daily  finasteride 5 milliGRAM(s) Oral daily  heparin   Injectable 5000 Unit(s) SubCutaneous every 12 hours  QUEtiapine 25 milliGRAM(s) Oral three times a day    MEDICATIONS  (PRN):

## 2025-01-05 NOTE — PROGRESS NOTE ADULT - ASSESSMENT
dehydration  hypernatremia  ANGEL  PNA    plan:    off IVF  draw bmp, no labs since 1/2  1:1 feeding puree diet  abx course complete

## 2025-01-09 DIAGNOSIS — G47.30 SLEEP APNEA, UNSPECIFIED: ICD-10-CM

## 2025-01-09 DIAGNOSIS — F02.80 DEMENTIA IN OTHER DISEASES CLASSIFIED ELSEWHERE, UNSPECIFIED SEVERITY, WITHOUT BEHAVIORAL DISTURBANCE, PSYCHOTIC DISTURBANCE, MOOD DISTURBANCE, AND ANXIETY: ICD-10-CM

## 2025-01-09 DIAGNOSIS — R00.1 BRADYCARDIA, UNSPECIFIED: ICD-10-CM

## 2025-01-09 DIAGNOSIS — G30.9 ALZHEIMER'S DISEASE, UNSPECIFIED: ICD-10-CM

## 2025-01-09 DIAGNOSIS — J69.0 PNEUMONITIS DUE TO INHALATION OF FOOD AND VOMIT: ICD-10-CM

## 2025-01-09 DIAGNOSIS — Z66 DO NOT RESUSCITATE: ICD-10-CM

## 2025-01-09 DIAGNOSIS — N40.0 BENIGN PROSTATIC HYPERPLASIA WITHOUT LOWER URINARY TRACT SYMPTOMS: ICD-10-CM

## 2025-01-09 DIAGNOSIS — E55.9 VITAMIN D DEFICIENCY, UNSPECIFIED: ICD-10-CM

## 2025-01-09 DIAGNOSIS — Z79.82 LONG TERM (CURRENT) USE OF ASPIRIN: ICD-10-CM

## 2025-01-09 DIAGNOSIS — N17.9 ACUTE KIDNEY FAILURE, UNSPECIFIED: ICD-10-CM

## 2025-01-09 DIAGNOSIS — Z11.52 ENCOUNTER FOR SCREENING FOR COVID-19: ICD-10-CM

## 2025-01-09 DIAGNOSIS — E86.0 DEHYDRATION: ICD-10-CM

## 2025-01-09 DIAGNOSIS — E87.0 HYPEROSMOLALITY AND HYPERNATREMIA: ICD-10-CM

## 2025-01-09 DIAGNOSIS — D53.9 NUTRITIONAL ANEMIA, UNSPECIFIED: ICD-10-CM

## 2025-01-09 DIAGNOSIS — Z51.5 ENCOUNTER FOR PALLIATIVE CARE: ICD-10-CM

## 2025-01-09 DIAGNOSIS — I10 ESSENTIAL (PRIMARY) HYPERTENSION: ICD-10-CM

## 2025-01-09 DIAGNOSIS — E87.6 HYPOKALEMIA: ICD-10-CM

## 2025-01-09 DIAGNOSIS — E03.8 OTHER SPECIFIED HYPOTHYROIDISM: ICD-10-CM

## 2025-01-09 DIAGNOSIS — N43.3 HYDROCELE, UNSPECIFIED: ICD-10-CM

## 2025-01-09 DIAGNOSIS — G89.0 CENTRAL PAIN SYNDROME: ICD-10-CM
